# Patient Record
Sex: MALE | Race: WHITE | NOT HISPANIC OR LATINO | Employment: FULL TIME | URBAN - METROPOLITAN AREA
[De-identification: names, ages, dates, MRNs, and addresses within clinical notes are randomized per-mention and may not be internally consistent; named-entity substitution may affect disease eponyms.]

---

## 2017-02-06 ENCOUNTER — HOSPITAL ENCOUNTER (OUTPATIENT)
Dept: LAB | Facility: MEDICAL CENTER | Age: 67
End: 2017-02-06
Attending: FAMILY MEDICINE
Payer: COMMERCIAL

## 2017-02-06 LAB
ALBUMIN SERPL BCP-MCNC: 4.5 G/DL (ref 3.2–4.9)
ALBUMIN/GLOB SERPL: 1.7 G/DL
ALP SERPL-CCNC: 108 U/L (ref 30–99)
ALT SERPL-CCNC: 19 U/L (ref 2–50)
ANION GAP SERPL CALC-SCNC: 8 MMOL/L (ref 0–11.9)
AST SERPL-CCNC: 20 U/L (ref 12–45)
BILIRUB SERPL-MCNC: 0.5 MG/DL (ref 0.1–1.5)
BUN SERPL-MCNC: 18 MG/DL (ref 8–22)
CALCIUM SERPL-MCNC: 9.6 MG/DL (ref 8.5–10.5)
CHLORIDE SERPL-SCNC: 103 MMOL/L (ref 96–112)
CHOLEST SERPL-MCNC: 176 MG/DL (ref 100–199)
CO2 SERPL-SCNC: 27 MMOL/L (ref 20–33)
CREAT SERPL-MCNC: 0.84 MG/DL (ref 0.5–1.4)
EST. AVERAGE GLUCOSE BLD GHB EST-MCNC: 200 MG/DL
GLOBULIN SER CALC-MCNC: 2.6 G/DL (ref 1.9–3.5)
GLUCOSE SERPL-MCNC: 153 MG/DL (ref 65–99)
HBA1C MFR BLD: 8.6 % (ref 0–5.6)
HDLC SERPL-MCNC: 45 MG/DL
LDLC SERPL CALC-MCNC: 108 MG/DL
POTASSIUM SERPL-SCNC: 3.9 MMOL/L (ref 3.6–5.5)
PROT SERPL-MCNC: 7.1 G/DL (ref 6–8.2)
PSA SERPL DL<=0.01 NG/ML-MCNC: 0.09 NG/ML (ref 0–4)
SODIUM SERPL-SCNC: 138 MMOL/L (ref 135–145)
TRIGL SERPL-MCNC: 113 MG/DL (ref 0–149)

## 2017-02-06 PROCEDURE — 83036 HEMOGLOBIN GLYCOSYLATED A1C: CPT

## 2017-02-06 PROCEDURE — 84153 ASSAY OF PSA TOTAL: CPT

## 2017-02-06 PROCEDURE — 80061 LIPID PANEL: CPT

## 2017-02-06 PROCEDURE — 36415 COLL VENOUS BLD VENIPUNCTURE: CPT

## 2017-02-06 PROCEDURE — 80053 COMPREHEN METABOLIC PANEL: CPT

## 2017-05-30 ENCOUNTER — HOSPITAL ENCOUNTER (OUTPATIENT)
Dept: LAB | Facility: MEDICAL CENTER | Age: 67
End: 2017-05-30
Attending: FAMILY MEDICINE
Payer: COMMERCIAL

## 2017-05-30 LAB
ALBUMIN SERPL BCP-MCNC: 3.9 G/DL (ref 3.2–4.9)
ALBUMIN/GLOB SERPL: 1.3 G/DL
ALP SERPL-CCNC: 96 U/L (ref 30–99)
ALT SERPL-CCNC: 17 U/L (ref 2–50)
ANION GAP SERPL CALC-SCNC: 8 MMOL/L (ref 0–11.9)
AST SERPL-CCNC: 16 U/L (ref 12–45)
BASOPHILS # BLD AUTO: 0.6 % (ref 0–1.8)
BASOPHILS # BLD: 0.04 K/UL (ref 0–0.12)
BILIRUB SERPL-MCNC: 0.7 MG/DL (ref 0.1–1.5)
BUN SERPL-MCNC: 19 MG/DL (ref 8–22)
CALCIUM SERPL-MCNC: 9 MG/DL (ref 8.5–10.5)
CHLORIDE SERPL-SCNC: 102 MMOL/L (ref 96–112)
CHOLEST SERPL-MCNC: 173 MG/DL (ref 100–199)
CO2 SERPL-SCNC: 25 MMOL/L (ref 20–33)
CREAT SERPL-MCNC: 0.86 MG/DL (ref 0.5–1.4)
EOSINOPHIL # BLD AUTO: 0.16 K/UL (ref 0–0.51)
EOSINOPHIL NFR BLD: 2.2 % (ref 0–6.9)
ERYTHROCYTE [DISTWIDTH] IN BLOOD BY AUTOMATED COUNT: 45.5 FL (ref 35.9–50)
EST. AVERAGE GLUCOSE BLD GHB EST-MCNC: 229 MG/DL
GFR SERPL CREATININE-BSD FRML MDRD: >60 ML/MIN/1.73 M 2
GLOBULIN SER CALC-MCNC: 3.1 G/DL (ref 1.9–3.5)
GLUCOSE SERPL-MCNC: 144 MG/DL (ref 65–99)
HBA1C MFR BLD: 9.6 % (ref 0–5.6)
HCT VFR BLD AUTO: 46.5 % (ref 42–52)
HDLC SERPL-MCNC: 50 MG/DL
HGB BLD-MCNC: 15 G/DL (ref 14–18)
IMM GRANULOCYTES # BLD AUTO: 0.03 K/UL (ref 0–0.11)
IMM GRANULOCYTES NFR BLD AUTO: 0.4 % (ref 0–0.9)
LDLC SERPL CALC-MCNC: 96 MG/DL
LYMPHOCYTES # BLD AUTO: 2 K/UL (ref 1–4.8)
LYMPHOCYTES NFR BLD: 28.1 % (ref 22–41)
MCH RBC QN AUTO: 29.2 PG (ref 27–33)
MCHC RBC AUTO-ENTMCNC: 32.3 G/DL (ref 33.7–35.3)
MCV RBC AUTO: 90.6 FL (ref 81.4–97.8)
MONOCYTES # BLD AUTO: 0.61 K/UL (ref 0–0.85)
MONOCYTES NFR BLD AUTO: 8.6 % (ref 0–13.4)
NEUTROPHILS # BLD AUTO: 4.29 K/UL (ref 1.82–7.42)
NEUTROPHILS NFR BLD: 60.1 % (ref 44–72)
NRBC # BLD AUTO: 0 K/UL
NRBC BLD AUTO-RTO: 0 /100 WBC
PLATELET # BLD AUTO: 251 K/UL (ref 164–446)
PMV BLD AUTO: 10.2 FL (ref 9–12.9)
POTASSIUM SERPL-SCNC: 3.8 MMOL/L (ref 3.6–5.5)
PROT SERPL-MCNC: 7 G/DL (ref 6–8.2)
RBC # BLD AUTO: 5.13 M/UL (ref 4.7–6.1)
SODIUM SERPL-SCNC: 135 MMOL/L (ref 135–145)
TRIGL SERPL-MCNC: 137 MG/DL (ref 0–149)
WBC # BLD AUTO: 7.1 K/UL (ref 4.8–10.8)

## 2017-05-30 PROCEDURE — 80061 LIPID PANEL: CPT

## 2017-05-30 PROCEDURE — 80053 COMPREHEN METABOLIC PANEL: CPT

## 2017-05-30 PROCEDURE — 83036 HEMOGLOBIN GLYCOSYLATED A1C: CPT

## 2017-05-30 PROCEDURE — 36415 COLL VENOUS BLD VENIPUNCTURE: CPT

## 2017-05-30 PROCEDURE — 85025 COMPLETE CBC W/AUTO DIFF WBC: CPT

## 2017-10-19 ENCOUNTER — HOSPITAL ENCOUNTER (OUTPATIENT)
Dept: LAB | Facility: MEDICAL CENTER | Age: 67
End: 2017-10-19
Attending: FAMILY MEDICINE
Payer: COMMERCIAL

## 2017-10-19 LAB
EST. AVERAGE GLUCOSE BLD GHB EST-MCNC: 209 MG/DL
HBA1C MFR BLD: 8.9 % (ref 0–5.6)

## 2017-10-19 PROCEDURE — 83036 HEMOGLOBIN GLYCOSYLATED A1C: CPT

## 2017-10-19 PROCEDURE — 36415 COLL VENOUS BLD VENIPUNCTURE: CPT

## 2017-12-18 ENCOUNTER — HOSPITAL ENCOUNTER (OUTPATIENT)
Dept: LAB | Facility: MEDICAL CENTER | Age: 67
End: 2017-12-18
Attending: FAMILY MEDICINE
Payer: COMMERCIAL

## 2017-12-18 LAB
ALBUMIN SERPL BCP-MCNC: 4.1 G/DL (ref 3.2–4.9)
ALBUMIN/GLOB SERPL: 1.5 G/DL
ALP SERPL-CCNC: 82 U/L (ref 30–99)
ALT SERPL-CCNC: 18 U/L (ref 2–50)
ANION GAP SERPL CALC-SCNC: 7 MMOL/L (ref 0–11.9)
AST SERPL-CCNC: 18 U/L (ref 12–45)
BILIRUB SERPL-MCNC: 0.7 MG/DL (ref 0.1–1.5)
BUN SERPL-MCNC: 17 MG/DL (ref 8–22)
CALCIUM SERPL-MCNC: 9.2 MG/DL (ref 8.5–10.5)
CHLORIDE SERPL-SCNC: 102 MMOL/L (ref 96–112)
CHOLEST SERPL-MCNC: 146 MG/DL (ref 100–199)
CO2 SERPL-SCNC: 28 MMOL/L (ref 20–33)
CREAT SERPL-MCNC: 0.85 MG/DL (ref 0.5–1.4)
EST. AVERAGE GLUCOSE BLD GHB EST-MCNC: 209 MG/DL
GFR SERPL CREATININE-BSD FRML MDRD: >60 ML/MIN/1.73 M 2
GLOBULIN SER CALC-MCNC: 2.8 G/DL (ref 1.9–3.5)
GLUCOSE SERPL-MCNC: 163 MG/DL (ref 65–99)
HBA1C MFR BLD: 8.9 % (ref 0–5.6)
HDLC SERPL-MCNC: 47 MG/DL
LDLC SERPL CALC-MCNC: 73 MG/DL
POTASSIUM SERPL-SCNC: 4.6 MMOL/L (ref 3.6–5.5)
PROT SERPL-MCNC: 6.9 G/DL (ref 6–8.2)
SODIUM SERPL-SCNC: 137 MMOL/L (ref 135–145)
TRIGL SERPL-MCNC: 131 MG/DL (ref 0–149)

## 2017-12-18 PROCEDURE — 80053 COMPREHEN METABOLIC PANEL: CPT

## 2017-12-18 PROCEDURE — 83036 HEMOGLOBIN GLYCOSYLATED A1C: CPT

## 2017-12-18 PROCEDURE — 80061 LIPID PANEL: CPT

## 2017-12-18 PROCEDURE — 36415 COLL VENOUS BLD VENIPUNCTURE: CPT

## 2018-03-09 ENCOUNTER — HOSPITAL ENCOUNTER (OUTPATIENT)
Dept: LAB | Facility: MEDICAL CENTER | Age: 68
End: 2018-03-09
Attending: FAMILY MEDICINE
Payer: COMMERCIAL

## 2018-03-09 LAB
ALBUMIN SERPL BCP-MCNC: 4.2 G/DL (ref 3.2–4.9)
ALBUMIN/GLOB SERPL: 1.4 G/DL
ALP SERPL-CCNC: 76 U/L (ref 30–99)
ALT SERPL-CCNC: 17 U/L (ref 2–50)
ANION GAP SERPL CALC-SCNC: 7 MMOL/L (ref 0–11.9)
AST SERPL-CCNC: 18 U/L (ref 12–45)
BILIRUB SERPL-MCNC: 0.7 MG/DL (ref 0.1–1.5)
BUN SERPL-MCNC: 17 MG/DL (ref 8–22)
CALCIUM SERPL-MCNC: 9.6 MG/DL (ref 8.5–10.5)
CHLORIDE SERPL-SCNC: 102 MMOL/L (ref 96–112)
CHOLEST SERPL-MCNC: 149 MG/DL (ref 100–199)
CO2 SERPL-SCNC: 27 MMOL/L (ref 20–33)
CREAT SERPL-MCNC: 0.85 MG/DL (ref 0.5–1.4)
EST. AVERAGE GLUCOSE BLD GHB EST-MCNC: 220 MG/DL
GLOBULIN SER CALC-MCNC: 2.9 G/DL (ref 1.9–3.5)
GLUCOSE SERPL-MCNC: 134 MG/DL (ref 65–99)
HBA1C MFR BLD: 9.3 % (ref 0–5.6)
HDLC SERPL-MCNC: 47 MG/DL
LDLC SERPL CALC-MCNC: 83 MG/DL
POTASSIUM SERPL-SCNC: 4.1 MMOL/L (ref 3.6–5.5)
PROT SERPL-MCNC: 7.1 G/DL (ref 6–8.2)
SODIUM SERPL-SCNC: 136 MMOL/L (ref 135–145)
TRIGL SERPL-MCNC: 97 MG/DL (ref 0–149)

## 2018-03-09 PROCEDURE — 80061 LIPID PANEL: CPT

## 2018-03-09 PROCEDURE — 80053 COMPREHEN METABOLIC PANEL: CPT

## 2018-03-09 PROCEDURE — 36415 COLL VENOUS BLD VENIPUNCTURE: CPT

## 2018-03-09 PROCEDURE — 83036 HEMOGLOBIN GLYCOSYLATED A1C: CPT

## 2018-06-13 ENCOUNTER — HOSPITAL ENCOUNTER (OUTPATIENT)
Dept: LAB | Facility: MEDICAL CENTER | Age: 68
End: 2018-06-13
Attending: FAMILY MEDICINE
Payer: COMMERCIAL

## 2018-06-13 LAB
ALBUMIN SERPL BCP-MCNC: 4.3 G/DL (ref 3.2–4.9)
ALBUMIN/GLOB SERPL: 1.3 G/DL
ALP SERPL-CCNC: 64 U/L (ref 30–99)
ALT SERPL-CCNC: 17 U/L (ref 2–50)
ANION GAP SERPL CALC-SCNC: 10 MMOL/L (ref 0–11.9)
AST SERPL-CCNC: 20 U/L (ref 12–45)
BILIRUB SERPL-MCNC: 0.6 MG/DL (ref 0.1–1.5)
BUN SERPL-MCNC: 20 MG/DL (ref 8–22)
CALCIUM SERPL-MCNC: 9.2 MG/DL (ref 8.5–10.5)
CHLORIDE SERPL-SCNC: 103 MMOL/L (ref 96–112)
CHOLEST SERPL-MCNC: 149 MG/DL (ref 100–199)
CO2 SERPL-SCNC: 27 MMOL/L (ref 20–33)
CREAT SERPL-MCNC: 0.97 MG/DL (ref 0.5–1.4)
EST. AVERAGE GLUCOSE BLD GHB EST-MCNC: 169 MG/DL
GLOBULIN SER CALC-MCNC: 3.3 G/DL (ref 1.9–3.5)
GLUCOSE SERPL-MCNC: 105 MG/DL (ref 65–99)
HBA1C MFR BLD: 7.5 % (ref 0–5.6)
HDLC SERPL-MCNC: 49 MG/DL
LDLC SERPL CALC-MCNC: 89 MG/DL
POTASSIUM SERPL-SCNC: 3.7 MMOL/L (ref 3.6–5.5)
PROT SERPL-MCNC: 7.6 G/DL (ref 6–8.2)
SODIUM SERPL-SCNC: 140 MMOL/L (ref 135–145)
TRIGL SERPL-MCNC: 57 MG/DL (ref 0–149)

## 2018-06-13 PROCEDURE — 80053 COMPREHEN METABOLIC PANEL: CPT

## 2018-06-13 PROCEDURE — 36415 COLL VENOUS BLD VENIPUNCTURE: CPT

## 2018-06-13 PROCEDURE — 80061 LIPID PANEL: CPT

## 2018-06-13 PROCEDURE — 83036 HEMOGLOBIN GLYCOSYLATED A1C: CPT

## 2018-09-20 ENCOUNTER — HOSPITAL ENCOUNTER (OUTPATIENT)
Dept: LAB | Facility: MEDICAL CENTER | Age: 68
End: 2018-09-20
Attending: FAMILY MEDICINE
Payer: COMMERCIAL

## 2018-09-20 LAB
ALBUMIN SERPL BCP-MCNC: 4.3 G/DL (ref 3.2–4.9)
ALBUMIN/GLOB SERPL: 1.5 G/DL
ALP SERPL-CCNC: 80 U/L (ref 30–99)
ALT SERPL-CCNC: 16 U/L (ref 2–50)
ANION GAP SERPL CALC-SCNC: 6 MMOL/L (ref 0–11.9)
AST SERPL-CCNC: 16 U/L (ref 12–45)
BASOPHILS # BLD AUTO: 0.7 % (ref 0–1.8)
BASOPHILS # BLD: 0.05 K/UL (ref 0–0.12)
BILIRUB SERPL-MCNC: 0.6 MG/DL (ref 0.1–1.5)
BUN SERPL-MCNC: 18 MG/DL (ref 8–22)
CALCIUM SERPL-MCNC: 9.3 MG/DL (ref 8.5–10.5)
CHLORIDE SERPL-SCNC: 103 MMOL/L (ref 96–112)
CHOLEST SERPL-MCNC: 153 MG/DL (ref 100–199)
CO2 SERPL-SCNC: 30 MMOL/L (ref 20–33)
CREAT SERPL-MCNC: 0.96 MG/DL (ref 0.5–1.4)
EOSINOPHIL # BLD AUTO: 0.14 K/UL (ref 0–0.51)
EOSINOPHIL NFR BLD: 1.9 % (ref 0–6.9)
ERYTHROCYTE [DISTWIDTH] IN BLOOD BY AUTOMATED COUNT: 46.4 FL (ref 35.9–50)
EST. AVERAGE GLUCOSE BLD GHB EST-MCNC: 171 MG/DL
GLOBULIN SER CALC-MCNC: 2.8 G/DL (ref 1.9–3.5)
GLUCOSE SERPL-MCNC: 108 MG/DL (ref 65–99)
HBA1C MFR BLD: 7.6 % (ref 0–5.6)
HCT VFR BLD AUTO: 48.8 % (ref 42–52)
HDLC SERPL-MCNC: 45 MG/DL
HGB BLD-MCNC: 15.6 G/DL (ref 14–18)
IMM GRANULOCYTES # BLD AUTO: 0.03 K/UL (ref 0–0.11)
IMM GRANULOCYTES NFR BLD AUTO: 0.4 % (ref 0–0.9)
LDLC SERPL CALC-MCNC: 84 MG/DL
LYMPHOCYTES # BLD AUTO: 2.11 K/UL (ref 1–4.8)
LYMPHOCYTES NFR BLD: 28.1 % (ref 22–41)
MCH RBC QN AUTO: 29.3 PG (ref 27–33)
MCHC RBC AUTO-ENTMCNC: 32 G/DL (ref 33.7–35.3)
MCV RBC AUTO: 91.6 FL (ref 81.4–97.8)
MONOCYTES # BLD AUTO: 0.58 K/UL (ref 0–0.85)
MONOCYTES NFR BLD AUTO: 7.7 % (ref 0–13.4)
NEUTROPHILS # BLD AUTO: 4.59 K/UL (ref 1.82–7.42)
NEUTROPHILS NFR BLD: 61.2 % (ref 44–72)
NRBC # BLD AUTO: 0 K/UL
NRBC BLD-RTO: 0 /100 WBC
PLATELET # BLD AUTO: 251 K/UL (ref 164–446)
PMV BLD AUTO: 10.4 FL (ref 9–12.9)
POTASSIUM SERPL-SCNC: 4 MMOL/L (ref 3.6–5.5)
PROT SERPL-MCNC: 7.1 G/DL (ref 6–8.2)
PSA SERPL-MCNC: 0.07 NG/ML (ref 0–4)
RBC # BLD AUTO: 5.33 M/UL (ref 4.7–6.1)
SODIUM SERPL-SCNC: 139 MMOL/L (ref 135–145)
TRIGL SERPL-MCNC: 122 MG/DL (ref 0–149)
WBC # BLD AUTO: 7.5 K/UL (ref 4.8–10.8)

## 2018-09-20 PROCEDURE — 85025 COMPLETE CBC W/AUTO DIFF WBC: CPT

## 2018-09-20 PROCEDURE — 83036 HEMOGLOBIN GLYCOSYLATED A1C: CPT

## 2018-09-20 PROCEDURE — 36415 COLL VENOUS BLD VENIPUNCTURE: CPT

## 2018-09-20 PROCEDURE — 80061 LIPID PANEL: CPT

## 2018-09-20 PROCEDURE — 80053 COMPREHEN METABOLIC PANEL: CPT

## 2018-09-20 PROCEDURE — 84153 ASSAY OF PSA TOTAL: CPT

## 2018-10-24 ENCOUNTER — HOME STUDY (OUTPATIENT)
Dept: SLEEP MEDICINE | Facility: MEDICAL CENTER | Age: 68
End: 2018-10-24
Attending: FAMILY MEDICINE
Payer: COMMERCIAL

## 2018-10-24 ENCOUNTER — SLEEP CENTER VISIT (OUTPATIENT)
Dept: SLEEP MEDICINE | Facility: MEDICAL CENTER | Age: 68
End: 2018-10-24
Payer: COMMERCIAL

## 2018-10-24 VITALS
BODY MASS INDEX: 33.66 KG/M2 | RESPIRATION RATE: 15 BRPM | SYSTOLIC BLOOD PRESSURE: 118 MMHG | OXYGEN SATURATION: 96 % | DIASTOLIC BLOOD PRESSURE: 70 MMHG | HEART RATE: 74 BPM | HEIGHT: 73 IN | WEIGHT: 254 LBS

## 2018-10-24 DIAGNOSIS — G47.30 SLEEP DISORDER BREATHING: ICD-10-CM

## 2018-10-24 DIAGNOSIS — E66.9 OBESITY (BMI 30-39.9): ICD-10-CM

## 2018-10-24 PROCEDURE — 95806 SLEEP STUDY UNATT&RESP EFFT: CPT | Performed by: FAMILY MEDICINE

## 2018-10-24 PROCEDURE — 99243 OFF/OP CNSLTJ NEW/EST LOW 30: CPT | Performed by: FAMILY MEDICINE

## 2018-10-24 RX ORDER — PIOGLITAZONEHYDROCHLORIDE 30 MG/1
30 TABLET ORAL DAILY
COMMUNITY
End: 2023-04-19

## 2018-10-24 RX ORDER — PIROXICAM 10 MG/1
10 CAPSULE ORAL DAILY
COMMUNITY
End: 2023-04-19

## 2018-10-24 RX ORDER — LOSARTAN POTASSIUM AND HYDROCHLOROTHIAZIDE 12.5; 5 MG/1; MG/1
1 TABLET ORAL DAILY
COMMUNITY
End: 2023-04-19

## 2018-10-24 RX ORDER — GLIMEPIRIDE 2 MG/1
2 TABLET ORAL EVERY MORNING
COMMUNITY
End: 2022-05-04

## 2018-10-24 NOTE — PROGRESS NOTES
"     [facility]  Consult Note     Date: 10/24/2018 / Time: 1:39 PM    Patient ID:   Name:             Darvin Jackson   YOB: 1950  Age:                 68 y.o.  male   MRN:               [id number]      Thank you for requesting a sleep medicine consultation on Darvin Jackson at the sleep center.he presents today with the chief complaints of ROXANE and to establish as a new pt. he was previously seen by . Pt was diagnosed with ROXANE in 1980's and has been on CPAPsince then. His current CPAP is 10 years old and [facility]. The initial presenting symptoms were snoring and witnessed apneas. He is referred by Dr. Lundberg for evaluation and treatment of sleep disorder breathing.     HISTORY OF PRESENT ILLNESS:       At night,  Darvin Jackson goes to bed around 10 pm on weekdays and on weekends. He gets out of bed at 4:30 am on weekdays and on weekends.He averages 6 hrs of sleep on a good night and 4-5 hrs on a bad night. Pt has bad nights 1 nights per week. He falls asleep within less than 5 min minutes. He rarely awakens middle of the night due  to no obvious reason. It takes him few min to fall back asleep.     Manuel is not aware of snoring/witnessed apneas/gasping or choking in sleep with the usage of the CPAP.  He  denies any symptoms of restless legs syndrome such as an \"urge to move\"  He  legs in the evening or bedtime. He  denies any symptoms of narcolepsy such as sleep paralysis or cataplexy, or any symptoms to suggest parasomnias such as sleep walking or acting out of dreams. He  has not used any medications for her sleep problem.    Overall,  He does finds his sleep refreshing. When He  wakes up in the morning, He does not feel tired. In terms of  excessive daytime sleepiness,  He complains of sleepiness while  at work or watching TV. [place] sleepiness scale score is normal at  5/24.   He does not take regular naps.   REVIEW OF SYSTEMS:    "    Constitutional: Denies fevers, Denies weight changes  Eyes: Denies changes in vision, no eye pain  Ears/Nose/Throat/Mouth: Denies nasal congestion or sore throat   Cardiovascular: Denies chest pain or palpitations   Respiratory: Denies shortness of breath , Denies cough  Gastrointestinal/Hepatic: Denies abdominal pain, nausea, vomiting, diarrhea, constipation or GI bleeding   Genitourinary: Denies bladder dysfunction, dysuria or frequency  Musculoskeletal/Rheum: Denies  joint pain and swelling   Skin/Breast: Denies rash, denies breast lumps or discharge  Neurological: Denies headache, confusion, memory loss or focal weakness/parasthesias  Psychiatric: denies mood disorder     Comprehensive review of systems form is reviewed with the patient and is attached in the EMR.     PMH:  has no past medical history on file.  MEDS:   Current Outpatient Prescriptions:   •  Dulaglutide (TRULICITY) 1.5 MG/0.5ML Solution Pen-injector, Inject  as instructed., Disp: , Rfl:   •  pioglitazone (ACTOS) 30 MG Tab, Take 30 mg by mouth every day., Disp: , Rfl:   •  piroxicam (FELDENE) 10 MG Cap, Take 10 mg by mouth every day., Disp: , Rfl:   •  Empagliflozin-Metformin HCl ER (SYNJARDY XR) 12.5-1000 MG TABLET SR 24 HR, Take  by mouth., Disp: , Rfl:   •  losartan-hydrochlorothiazide (HYZAAR) 50-12.5 MG per tablet, Take 1 Tab by mouth every day., Disp: , Rfl:   •  glimepiride (AMARYL) 2 MG Tab, Take 2 mg by mouth every morning., Disp: , Rfl:   ALLERGIES: No Known Allergies  SURGHX: History reviewed. No pertinent surgical history.  SOCHX:  reports that he has been smoking Cigars.  He has never used smokeless tobacco. He reports that he drinks about 1.2 - 3.6 oz of alcohol per week . He reports that he does not use drugs...   FH:   Family History   Problem Relation Age of Onset   • Sleep Apnea Brother            Physical Exam:  Vitals/ General Appearance:   Weight/BMI: Body mass index is 33.51 kg/m².  Blood pressure 118/70, pulse 74, resp.  "rate 15, height 1.854 m (6' 1\"), weight 115.2 kg (254 lb), SpO2 96 %.  Vitals:    10/24/18 1333   BP: 118/70   BP Location: Left arm   Patient Position: Sitting   BP Cuff Size: Adult   Pulse: 74   Resp: 15   SpO2: 96%   Weight: 115.2 kg (254 lb)   Height: 1.854 m (6' 1\")       Pt. is alert and oriented to time, place and person. Cooperative and in no apparent distress.       1. Head: Atraumatic, normocephalic.   2. Ears: Normal tympanic membrane and no discharge  3. Nose: No inferior turbinate hypertophy  4. Throat: Oropharynx appears crowded in that the palate i overhanging ([place] scale 4. Tonsils are not enlarged, uvula is  large, pharynx not inflamed. Tongue is large. has intact dentition and oral hygiene is fair.  5. Neck: Supple. No thyromegaly  6. Chest: Trachea central  7. Lungs auscultation: B/L good air entry, vesicular breath sounds, no adventitious sounds  8. Heart auscultation: 1st and 2nd heart sounds normal, regular rhythm. No appreciable murmur.  9. Abdomen: Soft, non tender, no organomegaly. Bowel sounds present  10. Extremities:no pedal edema.   Peripheral pulses felt.  11. Skin: No rash  12. NEUROLOGICAL EXAMINATION: On neurological exam, the patient was alert and oriented x3. speech was clear and fluent without dysarthria.      INVESTIGATIONS:       ASSESSMENT AND PLAN       1. Sleep Apnea (ROXANE).He  Is currently on CPAP 18 cm .  The symptoms of excessive daytime, snoring and gasping has improved      The pathophysiology of sleep anea and the increased risk of cardiovascular morbidity from untreated sleep apnea is discussed in detail with the patient. He  also has HTN, DM which can be worsened by her ROXANE.     He is urged to avoid supine sleep, weight gain and alcoholic beverages since all of these can worsen sleep apnea. He is cautioned against drowsy driving. If He feels sleepy while driving, He must pull over for a break/nap, rather than persist on the road, in the interest of He own safety " and that of others on the road.   Plan   - Continue CPAP 18 cm with nasal pillow mask    - HST is ordered to re establish the diagnosis. Order new CPAP after the HST    - compliance was reinforced    - F/u for 1st compliance on new CPAP   4.Patient's body mass index is 33.51 kg/m². Exercise and nutrition counseling were performed at this visit.

## 2018-10-25 ENCOUNTER — TELEPHONE (OUTPATIENT)
Dept: SLEEP MEDICINE | Facility: MEDICAL CENTER | Age: 68
End: 2018-10-25

## 2018-10-25 DIAGNOSIS — G47.33 OSA (OBSTRUCTIVE SLEEP APNEA): ICD-10-CM

## 2018-10-25 NOTE — TELEPHONE ENCOUNTER
Pt brought in SD card for a download on CPAP. Scanned into media for review.  Also he just completed an HST, He would like to get a new CPAP with a different company, currently with [facility].    I advised him someone will let him know if we order one or if he needs more testing once the HST is read.

## 2018-10-27 NOTE — PROCEDURES
DIAGNOSTIC HOME SLEEP TEST (HST) REPORT       PATIENT ID:  NAME:  Darvin Jackson  MRN:               [id number]  YOB: 1950  DATE OF STUDY: 10/24/18      Impression:     This study shows evidence of:     1.Sever obstructive sleep apnea with  Respiratory Event Index (GRICEL) of 82.0 per hour. These findings are based on the recording time (flow evaluation time). It is not possible with this device to determine a traditional apnea+hypopnea index (AHI) for total sleep time since EEG channels are not available.     2.  O2 Sat. hiram was 69% and baseline O2 at 98 %. O2 sat was below 89% for 123 min of the flow evaluation time. Avg HR 60 BPM.      TECHNICAL DESCRIPTION:  [facility] Device used was a type-III home study device. Home sleep study recording included: Airflow recording by nasal pressure transducer; Respiratory Effort by abdominal Respiratory Bands; O2 by finger oximetry. A position sensor and a snore channel was also used.    Scoring Criteria: A modification of the the AASM Manual for the Scoring of Sleep and Associated Events, 2012, was used.   Obstructive apnea was scored by cessation of airflow for at least 10 seconds with continuing respiratory effort.  Central apnea was scored by cessation of airflow for at least 10 seconds with no effort.  Hypopnea was scored by a 30% or more reduction in airflow for at least 10 seconds accompanied by an arterial oxygen desaturation of 3% or more.  (For Medicare patients, hypopneas were scored by a 30% or more reduction in airflow for at least 10 seconds accompanied by an arterial oxygen saturation of 4% or more, as required by their insurance, CMS.        General sleep summary: . Total recording time is 348 minutes and total flow evaluation time is 317 minutes. The patient spent 288 minutes in the supine position and 28 minutes in the nonsupine position.    Respiratory events:    Apneas: 407 (Obstructive apnea index 77/hr, Central apnea index 0 /hr,  mixed 0 /hour)  Hypopneas: 26    Recommendations:    1. CPAP titration study vs Auto CPAP trial .   2.   In general patients with sleep apnea are advised to avoid alcohol and sedatives and to not operate a motor vehicle while drowsy. In some cases alternative treatment options may prove effective in resolving sleep apnea in these options include upper airway surgery, the use of a dental orthotic or weight loss and positional therapy. Clinical correlation is required.         Charla Amin MD

## 2018-10-31 NOTE — TELEPHONE ENCOUNTER
Called informing him that Dr. Amin did sign in order for a new cpap machine. I also asked pt if he would like to stay with [facility] or if he will like to change DME company. Pt stated he will like to change his DME because he did have some issues with [facility]. I informed pt I will be faxing the order to DME:  [facility] /  [phone] / fax [phone]. I informed pt that [facility] will be contacting him within 2-3 week regarding his order and if he does not hear from them I advise him to contact them right away. I provide pt with [facility] phone number.

## 2019-01-08 ENCOUNTER — TELEPHONE (OUTPATIENT)
Dept: SLEEP MEDICINE | Facility: MEDICAL CENTER | Age: 69
End: 2019-01-08

## 2019-01-08 DIAGNOSIS — G47.33 OSA (OBSTRUCTIVE SLEEP APNEA): ICD-10-CM

## 2019-01-08 NOTE — TELEPHONE ENCOUNTER
"A faxed come over from [facility] with the compliance report stating that the pt requested the compliance report to be fax to us and she is also requesting a pressure change because right now the pressure is \" too high\".     Compliance is scan into media please advise.   "

## 2019-01-09 NOTE — TELEPHONE ENCOUNTER
Called pt to inform him that Dr. Amin did review his compliance report. Please see previous encounter for Dr. Amin response. Pt was okay with his response and is going to see if changing his mask will help out.

## 2019-01-09 NOTE — TELEPHONE ENCOUNTER
Recent compliance was reviewed. She is currently on ACPAP 17-20 cm. Avg pressure 17.5-18.5cm and the AHI is well controlled at 2.6/hr.

## 2019-01-10 ENCOUNTER — HOSPITAL ENCOUNTER (OUTPATIENT)
Dept: LAB | Facility: MEDICAL CENTER | Age: 69
End: 2019-01-10
Attending: FAMILY MEDICINE
Payer: COMMERCIAL

## 2019-01-10 LAB
ALBUMIN SERPL BCP-MCNC: 4.1 G/DL (ref 3.2–4.9)
ALBUMIN/GLOB SERPL: 1.3 G/DL
ALP SERPL-CCNC: 77 U/L (ref 30–99)
ALT SERPL-CCNC: 13 U/L (ref 2–50)
ANION GAP SERPL CALC-SCNC: 9 MMOL/L (ref 0–11.9)
AST SERPL-CCNC: 16 U/L (ref 12–45)
BILIRUB SERPL-MCNC: 0.8 MG/DL (ref 0.1–1.5)
BUN SERPL-MCNC: 16 MG/DL (ref 8–22)
CALCIUM SERPL-MCNC: 9 MG/DL (ref 8.5–10.5)
CHLORIDE SERPL-SCNC: 103 MMOL/L (ref 96–112)
CHOLEST SERPL-MCNC: 126 MG/DL (ref 100–199)
CO2 SERPL-SCNC: 27 MMOL/L (ref 20–33)
CREAT SERPL-MCNC: 1 MG/DL (ref 0.5–1.4)
EST. AVERAGE GLUCOSE BLD GHB EST-MCNC: 192 MG/DL
GLOBULIN SER CALC-MCNC: 3.1 G/DL (ref 1.9–3.5)
GLUCOSE SERPL-MCNC: 100 MG/DL (ref 65–99)
HBA1C MFR BLD: 8.3 % (ref 0–5.6)
HDLC SERPL-MCNC: 43 MG/DL
LDLC SERPL CALC-MCNC: 68 MG/DL
POTASSIUM SERPL-SCNC: 3.9 MMOL/L (ref 3.6–5.5)
PROT SERPL-MCNC: 7.2 G/DL (ref 6–8.2)
SODIUM SERPL-SCNC: 139 MMOL/L (ref 135–145)
TRIGL SERPL-MCNC: 77 MG/DL (ref 0–149)

## 2019-01-10 PROCEDURE — 83036 HEMOGLOBIN GLYCOSYLATED A1C: CPT

## 2019-01-10 PROCEDURE — 80053 COMPREHEN METABOLIC PANEL: CPT

## 2019-01-10 PROCEDURE — 80061 LIPID PANEL: CPT

## 2019-01-10 PROCEDURE — 36415 COLL VENOUS BLD VENIPUNCTURE: CPT

## 2019-02-24 PROBLEM — G47.33 OSA (OBSTRUCTIVE SLEEP APNEA): Status: ACTIVE | Noted: 2019-02-24

## 2019-02-24 PROBLEM — Z92.29 UP TO DATE WITH INFLUENZA VACCINATION: Status: ACTIVE | Noted: 2019-02-24

## 2019-02-24 PROBLEM — F17.200 SMOKER: Status: ACTIVE | Noted: 2019-02-24

## 2019-02-25 ENCOUNTER — SLEEP CENTER VISIT (OUTPATIENT)
Dept: SLEEP MEDICINE | Facility: MEDICAL CENTER | Age: 69
End: 2019-02-25
Payer: COMMERCIAL

## 2019-02-25 VITALS
HEART RATE: 70 BPM | HEIGHT: 73 IN | RESPIRATION RATE: 16 BRPM | WEIGHT: 252 LBS | SYSTOLIC BLOOD PRESSURE: 116 MMHG | BODY MASS INDEX: 33.4 KG/M2 | OXYGEN SATURATION: 96 % | DIASTOLIC BLOOD PRESSURE: 60 MMHG

## 2019-02-25 DIAGNOSIS — E66.9 OBESITY (BMI 30-39.9): ICD-10-CM

## 2019-02-25 DIAGNOSIS — G47.33 OSA (OBSTRUCTIVE SLEEP APNEA): ICD-10-CM

## 2019-02-25 DIAGNOSIS — Z92.29 UP TO DATE WITH INFLUENZA VACCINATION: ICD-10-CM

## 2019-02-25 DIAGNOSIS — F17.200 SMOKER: ICD-10-CM

## 2019-02-25 PROCEDURE — 99214 OFFICE O/P EST MOD 30 MIN: CPT | Performed by: INTERNAL MEDICINE

## 2019-02-25 RX ORDER — ZOLPIDEM TARTRATE 5 MG/1
5 TABLET ORAL NIGHTLY PRN
Qty: 3 TAB | Refills: 0 | Status: SHIPPED | OUTPATIENT
Start: 2019-02-25 | End: 2019-02-25

## 2019-02-25 NOTE — PROGRESS NOTES
CC: Follow up for compliance    HPI:  Mr. Darvin Jackson RTC for HSAT results which showed an ahi of 82/hiram sat 69%.  A home sleep apnea test was done to reconfirm the diagnosis of obstructive sleep apnea syndrome in anticipation of getting him a new CPAP machine which he already got. He is currently on auto titrating CPAP 17-20 cm H2O with an average apnea hypopnea index of 3.2 and essentially no leak whatsoever.  He is using a nasal pillow mask. 30 day compliance is 100% for 6 hours and 13 minutes.    He was originally diagnosed to have sleep apnea in the 1980s and has been on CPAP since then.  He is a former patient of Dr. Beasley.    The patient reports improved symptoms using positive airway pressure.  Has experienced no significant problems with mask fit, mask leak, pressure tolerance, excessive airway dryness, nasal congestion, aerophagia, or chest pain.    Significant comorbidities and modifying factors include tobacco abuse, obesity, prostate cancer, hypertension, diabetes, and up-to-date with influenza vaccination      Patient Active Problem List    Diagnosis Date Noted   • ROXANE (obstructive sleep apnea) 02/24/2019   • Up to date with influenza vaccination 02/24/2019   • Smoker 02/24/2019   • Obesity (BMI 30-39.9) 10/24/2018       History reviewed. No pertinent past medical history.     History reviewed. No pertinent surgical history.    Family History   Problem Relation Age of Onset   • Sleep Apnea Brother        Social History     Social History   • Marital status:      Spouse name: N/A   • Number of children: N/A   • Years of education: N/A     Occupational History   • Not on file.     Social History Main Topics   • Smoking status: Current Some Day Smoker     Types: Cigars   • Smokeless tobacco: Never Used   • Alcohol use 1.2 - 3.6 oz/week     2 - 6 Cans of beer per week   • Drug use: No   • Sexual activity: Not on file     Other Topics Concern   • Not on file     Social History Narrative  "  • No narrative on file       Current Outpatient Prescriptions   Medication Sig Dispense Refill   • Dulaglutide (TRULICITY) 1.5 MG/0.5ML Solution Pen-injector Inject  as instructed.     • pioglitazone (ACTOS) 30 MG Tab Take 30 mg by mouth every day.     • Empagliflozin-Metformin HCl ER (SYNJARDY XR) 12.5-1000 MG TABLET SR 24 HR Take  by mouth.     • losartan-hydrochlorothiazide (HYZAAR) 50-12.5 MG per tablet Take 1 Tab by mouth every day.     • glimepiride (AMARYL) 2 MG Tab Take 2 mg by mouth every morning.     • piroxicam (FELDENE) 10 MG Cap Take 10 mg by mouth every day.       No current facility-administered medications for this visit.     \"CURRENT RX\"    ALLERGIES: Patient has no known allergies.    ROS    Constitutional: Denies fever, chills, sweats,  weight loss, fatigue  Cardiovascular: Denies chest pain, tightness, palpitations, swelling in legs/feet, fainting, difficulty breathing when lying down but gets better when sitting up.   Respiratory: Denies shortness of breath, cough, sputum, wheezing, painful breathing, coughing up blood.   Sleep: per HPI  Gastrointestinal: Denies  difficulty swallowing, nausea, abdominal pain, diarrhea, constipation, heartburn.  Musculoskeletal: Denies painful joints, sore muscles, back pain.        PHYSICAL EXAM  Obese  /60 (BP Location: Right arm, Patient Position: Sitting, BP Cuff Size: Adult)   Pulse 70   Resp 16   Ht 1.854 m (6' 1\")   Wt 114.3 kg (252 lb)   SpO2 96%   BMI 33.25 kg/m²   Appearance: Well-nourished, well-developed, no acute distress  Eyes:  PERRLA, EOMI  ENMT: without lesions, deformities;hearing grossly intact; tongue normal, posterior pharynx without erythema or exudate; Mallampati classification:   Neck: Supple, trachea midline, no masses  Respiratory effort:  No intercostal retractions or use of accessory muscles  Lung auscultation:  No wheezes rhonchi rubs or rales  Cardiac: No murmurs, rubs, or gallops; regular rhythm, normal rate; no " edema  Abdomen:  No tenderness, no organomegaly.  Obese  Musculoskeletal:  Grossly normal; gait and station normal; digits and nails normal  Skin:  No rashes, petechiae, cyanosis  Neurologic: without focal signs; oriented to person, time, place, and purpose; judgement intact  Psychiatric:  No depression, anxiety, agitation        PROBLEMS:  1. ROXANE (obstructive sleep apnea)    - DME CPAP    2. Up to date with influenza vaccination      3. Smoker      4. Obesity (BMI 30-39.9)    - OBESITY COUNSELING (No Charge): Patient identified as having weight management issue.  Appropriate orders and counseling given.      PLAN:   Has been advised to continue the current auto CPAP 17-20 cm H2O, clean equipment frequently, and get new mask and supplies as allowed by insurance and DME. Advised about potential problems including nasal obstruction/stuffiness, mask leak or discomfort , frequent awakenings, chill or dryness of the upper airway, noise, inconvenience, and lack of benefit. Recommend an earlier appointment, if significant treatment barriers develop.    He will speak to the DME company about his loud machine. Will empirically change in to 5-20 cmH2O as he feels the pressures are too high..    Have advised the patient to follow up with the appropriate healthcare practitioners for all other medical problems and issues.    Return to clinic in 6 months.

## 2019-04-24 ENCOUNTER — HOSPITAL ENCOUNTER (OUTPATIENT)
Dept: LAB | Facility: MEDICAL CENTER | Age: 69
End: 2019-04-24
Attending: FAMILY MEDICINE
Payer: COMMERCIAL

## 2019-04-24 LAB
ALBUMIN SERPL BCP-MCNC: 4.1 G/DL (ref 3.2–4.9)
ALBUMIN/GLOB SERPL: 1.5 G/DL
ALP SERPL-CCNC: 85 U/L (ref 30–99)
ALT SERPL-CCNC: 17 U/L (ref 2–50)
ANION GAP SERPL CALC-SCNC: 5 MMOL/L (ref 0–11.9)
AST SERPL-CCNC: 18 U/L (ref 12–45)
BILIRUB SERPL-MCNC: 0.7 MG/DL (ref 0.1–1.5)
BUN SERPL-MCNC: 23 MG/DL (ref 8–22)
CALCIUM SERPL-MCNC: 9.2 MG/DL (ref 8.5–10.5)
CHLORIDE SERPL-SCNC: 103 MMOL/L (ref 96–112)
CO2 SERPL-SCNC: 28 MMOL/L (ref 20–33)
CREAT SERPL-MCNC: 0.94 MG/DL (ref 0.5–1.4)
EST. AVERAGE GLUCOSE BLD GHB EST-MCNC: 183 MG/DL
GLOBULIN SER CALC-MCNC: 2.7 G/DL (ref 1.9–3.5)
GLUCOSE SERPL-MCNC: 117 MG/DL (ref 65–99)
HBA1C MFR BLD: 8 % (ref 0–5.6)
POTASSIUM SERPL-SCNC: 4.1 MMOL/L (ref 3.6–5.5)
PROT SERPL-MCNC: 6.8 G/DL (ref 6–8.2)
PSA SERPL-MCNC: 0.12 NG/ML (ref 0–4)
SODIUM SERPL-SCNC: 136 MMOL/L (ref 135–145)

## 2019-04-24 PROCEDURE — 83036 HEMOGLOBIN GLYCOSYLATED A1C: CPT

## 2019-04-24 PROCEDURE — 36415 COLL VENOUS BLD VENIPUNCTURE: CPT

## 2019-04-24 PROCEDURE — 80053 COMPREHEN METABOLIC PANEL: CPT

## 2019-04-24 PROCEDURE — 84153 ASSAY OF PSA TOTAL: CPT

## 2019-08-02 ENCOUNTER — HOSPITAL ENCOUNTER (OUTPATIENT)
Dept: LAB | Facility: MEDICAL CENTER | Age: 69
End: 2019-08-02
Attending: FAMILY MEDICINE
Payer: COMMERCIAL

## 2019-08-02 LAB
ALBUMIN SERPL BCP-MCNC: 4.1 G/DL (ref 3.2–4.9)
ALBUMIN/GLOB SERPL: 1.3 G/DL
ALP SERPL-CCNC: 86 U/L (ref 30–99)
ALT SERPL-CCNC: 20 U/L (ref 2–50)
ANION GAP SERPL CALC-SCNC: 9 MMOL/L (ref 0–11.9)
AST SERPL-CCNC: 16 U/L (ref 12–45)
BILIRUB SERPL-MCNC: 0.7 MG/DL (ref 0.1–1.5)
BUN SERPL-MCNC: 14 MG/DL (ref 8–22)
CALCIUM SERPL-MCNC: 9 MG/DL (ref 8.5–10.5)
CHLORIDE SERPL-SCNC: 101 MMOL/L (ref 96–112)
CHOLEST SERPL-MCNC: 179 MG/DL (ref 100–199)
CO2 SERPL-SCNC: 28 MMOL/L (ref 20–33)
CREAT SERPL-MCNC: 0.99 MG/DL (ref 0.5–1.4)
EST. AVERAGE GLUCOSE BLD GHB EST-MCNC: 186 MG/DL
FASTING STATUS PATIENT QL REPORTED: NORMAL
GLOBULIN SER CALC-MCNC: 3.2 G/DL (ref 1.9–3.5)
GLUCOSE SERPL-MCNC: 124 MG/DL (ref 65–99)
HBA1C MFR BLD: 8.1 % (ref 0–5.6)
HDLC SERPL-MCNC: 47 MG/DL
LDLC SERPL CALC-MCNC: 108 MG/DL
POTASSIUM SERPL-SCNC: 3.6 MMOL/L (ref 3.6–5.5)
PROT SERPL-MCNC: 7.3 G/DL (ref 6–8.2)
SODIUM SERPL-SCNC: 138 MMOL/L (ref 135–145)
TRIGL SERPL-MCNC: 118 MG/DL (ref 0–149)

## 2019-08-02 PROCEDURE — 83036 HEMOGLOBIN GLYCOSYLATED A1C: CPT

## 2019-08-02 PROCEDURE — 80053 COMPREHEN METABOLIC PANEL: CPT

## 2019-08-02 PROCEDURE — 80061 LIPID PANEL: CPT

## 2019-08-02 PROCEDURE — 36415 COLL VENOUS BLD VENIPUNCTURE: CPT

## 2019-08-21 ENCOUNTER — SLEEP CENTER VISIT (OUTPATIENT)
Dept: SLEEP MEDICINE | Facility: MEDICAL CENTER | Age: 69
End: 2019-08-21
Payer: COMMERCIAL

## 2019-08-21 VITALS
WEIGHT: 242 LBS | SYSTOLIC BLOOD PRESSURE: 116 MMHG | BODY MASS INDEX: 32.07 KG/M2 | HEART RATE: 80 BPM | OXYGEN SATURATION: 96 % | DIASTOLIC BLOOD PRESSURE: 62 MMHG | HEIGHT: 73 IN | RESPIRATION RATE: 16 BRPM

## 2019-08-21 DIAGNOSIS — G47.33 OSA (OBSTRUCTIVE SLEEP APNEA): ICD-10-CM

## 2019-08-21 DIAGNOSIS — I10 ESSENTIAL HYPERTENSION: ICD-10-CM

## 2019-08-21 DIAGNOSIS — F17.200 SMOKER: ICD-10-CM

## 2019-08-21 DIAGNOSIS — E66.9 OBESITY (BMI 30-39.9): ICD-10-CM

## 2019-08-21 PROCEDURE — 99214 OFFICE O/P EST MOD 30 MIN: CPT | Performed by: INTERNAL MEDICINE

## 2019-08-21 NOTE — PROGRESS NOTES
CC: Follow up for ROXANE on AutoPap    HPI:  Mr. Darvin Jackson is a 69-year-old  last seen 2/25/2019 for ROXANE on auto titrating CPAP 17-20 cm water.  At that time his residual average AHI was 3.2 with no leak.  Compliance is 100% for 6 hours and 13 minutes. Changed to 5-20 cm water at last OV.    He was originally diagnosed to have sleep apnea in the 1980s and has been on CPAP since then.  He is a former patient of Dr. Beasley.    Today, 8/21/2019, his 30-day compliance is 100% for 6 hours and 34 seconds.  His average residual apnea hypopnea index is 8.8.  He is on auto titrating CPAP 5 to 20 cm H2O.  His auto CPAP mean pressure is 12.  His auto CPAP peak average pressure is 14.5 cm water.     The patient reports improved symptoms using positive airway pressure.  Has experienced no significant problems with mask fit, mask leak, pressure tolerance, excessive airway dryness, nasal congestion, aerophagia, or chest pain    Significant comorbidities and modifying factors include tobacco abuse, obesity, prostate cancer, hypertension, diabetes, and up-to-date with influenza vaccination      Patient Active Problem List    Diagnosis Date Noted   • Essential hypertension 08/21/2019   • ROXANE (obstructive sleep apnea) 02/24/2019   • Up to date with influenza vaccination 02/24/2019   • Smoker 02/24/2019   • Obesity (BMI 30-39.9) 10/24/2018       History reviewed. No pertinent past medical history.     History reviewed. No pertinent surgical history.    Family History   Problem Relation Age of Onset   • Sleep Apnea Brother        Social History     Socioeconomic History   • Marital status:      Spouse name: Not on file   • Number of children: Not on file   • Years of education: Not on file   • Highest education level: Not on file   Occupational History   • Not on file   Social Needs   • Financial resource strain: Not on file   • Food insecurity:     Worry: Not on file     Inability: Not on file   •  "Transportation needs:     Medical: Not on file     Non-medical: Not on file   Tobacco Use   • Smoking status: Current Some Day Smoker     Types: Cigars   • Smokeless tobacco: Never Used   Substance and Sexual Activity   • Alcohol use: Yes     Alcohol/week: 1.2 - 3.6 oz     Types: 2 - 6 Cans of beer per week   • Drug use: No   • Sexual activity: Not on file   Lifestyle   • Physical activity:     Days per week: Not on file     Minutes per session: Not on file   • Stress: Not on file   Relationships   • Social connections:     Talks on phone: Not on file     Gets together: Not on file     Attends [employer] service: Not on file     Active member of club or organization: Not on file     Attends meetings of clubs or organizations: Not on file     Relationship status: Not on file   • Intimate partner violence:     Fear of current or ex partner: Not on file     Emotionally abused: Not on file     Physically abused: Not on file     Forced sexual activity: Not on file   Other Topics Concern   • Not on file   Social History Narrative   • Not on file       Current Outpatient Medications   Medication Sig Dispense Refill   • Dulaglutide (TRULICITY) 1.5 MG/0.5ML Solution Pen-injector Inject  as instructed.     • pioglitazone (ACTOS) 30 MG Tab Take 30 mg by mouth every day.     • piroxicam (FELDENE) 10 MG Cap Take 10 mg by mouth every day.     • Empagliflozin-Metformin HCl ER (SYNJARDY XR) 12.5-1000 MG TABLET SR 24 HR Take  by mouth.     • losartan-hydrochlorothiazide (HYZAAR) 50-12.5 MG per tablet Take 1 Tab by mouth every day.     • glimepiride (AMARYL) 2 MG Tab Take 2 mg by mouth every morning.       No current facility-administered medications for this visit.     \"CURRENT RX\"    ALLERGIES: Patient has no known allergies.    ROS    Constitutional: Denies fever, chills, sweats,  weight loss, fatigue  Cardiovascular: Denies chest pain, tightness, palpitations, swelling in legs/feet, fainting, difficulty breathing when lying down " "but gets better when sitting up.   Respiratory: Denies shortness of breath, cough, sputum, wheezing, painful breathing, coughing up blood.   Sleep: per HPI  Gastrointestinal: Denies  difficulty swallowing, nausea, abdominal pain, diarrhea, constipation, heartburn.  Musculoskeletal: Denies painful joints, sore muscles, back pain.        PHYSICAL EXAM  Obese    /62 (BP Location: Right arm, Patient Position: Sitting, BP Cuff Size: Large adult)   Pulse 80   Resp 16   Ht 1.854 m (6' 1\")   Wt 109.8 kg (242 lb)   SpO2 96%   BMI 31.93 kg/m²   Appearance: Well-nourished, well-developed, no acute distress  Eyes:  PERRLA, EOMI; glasses  ENMT: without lesions, deformities;hearing grossly intact; tongue normal, posterior pharynx without erythema or exudate; Mallampati classification: 4  Neck: Supple, trachea midline, no masses  Respiratory effort:  No intercostal retractions or use of accessory muscles  Lung auscultation:  No wheezes rhonchi rubs or rales  Cardiac: No murmurs, rubs, or gallops; regular rhythm, normal rate; no edema  Abdomen:  No tenderness, no organomegaly.  Obese  Musculoskeletal:  Grossly normal; gait and station normal; digits and nails normal  Skin:  No rashes, petechiae, cyanosis  Neurologic: without focal signs; oriented to person, time, place, and purpose; judgement intact  Psychiatric:  No depression, anxiety, agitation        PROBLEMS:    1. ROXANE (obstructive sleep apnea)    - DME Mask and Supplies    2. Obesity (BMI 30-39.9)    - OBESITY COUNSELING (No Charge): Patient identified as having weight management issue.  Appropriate orders and counseling given.    3. Smoker      4. Essential hypertension        PLAN:    F/U 6 months.    Has been advised to continue the current AutoPap, clean equipment frequently, and get new mask and supplies as allowed by insurance and DME. Advised about potential problems including nasal obstruction/stuffiness, mask leak or discomfort , frequent awakenings, " chill or dryness of the upper airway, noise, inconvenience, and lack of benefit. Recommend an earlier appointment, if significant treatment barriers develop.    Have advised the patient to follow up with the appropriate healthcare practitioners for all other medical problems and issues.

## 2019-12-10 ENCOUNTER — HOSPITAL ENCOUNTER (OUTPATIENT)
Dept: LAB | Facility: MEDICAL CENTER | Age: 69
End: 2019-12-10
Attending: FAMILY MEDICINE
Payer: COMMERCIAL

## 2019-12-10 LAB
ALBUMIN SERPL BCP-MCNC: 4.4 G/DL (ref 3.2–4.9)
ALBUMIN/GLOB SERPL: 1.8 G/DL
ALP SERPL-CCNC: 84 U/L (ref 30–99)
ALT SERPL-CCNC: 15 U/L (ref 2–50)
ANION GAP SERPL CALC-SCNC: 10 MMOL/L (ref 0–11.9)
AST SERPL-CCNC: 16 U/L (ref 12–45)
BILIRUB SERPL-MCNC: 1 MG/DL (ref 0.1–1.5)
BUN SERPL-MCNC: 15 MG/DL (ref 8–22)
CALCIUM SERPL-MCNC: 8.9 MG/DL (ref 8.5–10.5)
CHLORIDE SERPL-SCNC: 102 MMOL/L (ref 96–112)
CHOLEST SERPL-MCNC: 147 MG/DL (ref 100–199)
CO2 SERPL-SCNC: 27 MMOL/L (ref 20–33)
CREAT SERPL-MCNC: 1.02 MG/DL (ref 0.5–1.4)
EST. AVERAGE GLUCOSE BLD GHB EST-MCNC: 169 MG/DL
FASTING STATUS PATIENT QL REPORTED: NORMAL
GLOBULIN SER CALC-MCNC: 2.4 G/DL (ref 1.9–3.5)
GLUCOSE SERPL-MCNC: 94 MG/DL (ref 65–99)
HBA1C MFR BLD: 7.5 % (ref 0–5.6)
HDLC SERPL-MCNC: 49 MG/DL
LDLC SERPL CALC-MCNC: 84 MG/DL
POTASSIUM SERPL-SCNC: 4.1 MMOL/L (ref 3.6–5.5)
PROT SERPL-MCNC: 6.8 G/DL (ref 6–8.2)
SODIUM SERPL-SCNC: 139 MMOL/L (ref 135–145)
TRIGL SERPL-MCNC: 72 MG/DL (ref 0–149)

## 2019-12-10 PROCEDURE — 80053 COMPREHEN METABOLIC PANEL: CPT

## 2019-12-10 PROCEDURE — 36415 COLL VENOUS BLD VENIPUNCTURE: CPT

## 2019-12-10 PROCEDURE — 83036 HEMOGLOBIN GLYCOSYLATED A1C: CPT

## 2019-12-10 PROCEDURE — 80061 LIPID PANEL: CPT

## 2020-03-18 NOTE — PROGRESS NOTES
CC: Follow-up for ROXANE on AutoPap 5 to 20 cm water    HPI:  Mr. Darvin Jackson is a 69-year-old  last seen 8/21/2019 for ROXANE on auto titrating CPAP 5-15 cm water.  He was originally diagnosed to have sleep apnea in the 1980s and has been on CPAP since then.  He is a former patient of Dr. Beasley.    When to last seen his compliance was excellent and his average residual estimated apnea hypopnea index was 8.8.    Today, 3/19/2020, his 30-day compliance is 100% for 6 hours and 17 minutes.  He has an average residual apnea hypopnea index of 9.3.    The patient reports improved symptoms using positive airway pressure.  Has experienced no significant problems with mask fit, mask leak, pressure tolerance, excessive airway dryness, nasal congestion, aerophagia, or chest pain.    Significant comorbidities and modifying factors include tobacco abuse, obesity, prostate cancer, hypertension, diabetes, and up-to-date with influenza vaccination.       Patient Active Problem List    Diagnosis Date Noted   • Essential hypertension 08/21/2019   • ROXANE (obstructive sleep apnea) 02/24/2019   • Up to date with influenza vaccination 02/24/2019   • Smoker 02/24/2019   • Obesity (BMI 30-39.9) 10/24/2018       No past medical history on file.     No past surgical history on file.    Family History   Problem Relation Age of Onset   • Sleep Apnea Brother        Social History     Socioeconomic History   • Marital status:      Spouse name: Not on file   • Number of children: Not on file   • Years of education: Not on file   • Highest education level: Not on file   Occupational History   • Not on file   Social Needs   • Financial resource strain: Not on file   • Food insecurity     Worry: Not on file     Inability: Not on file   • Transportation needs     Medical: Not on file     Non-medical: Not on file   Tobacco Use   • Smoking status: Current Some Day Smoker     Types: Cigars   • Smokeless tobacco: Never Used  "  Substance and Sexual Activity   • Alcohol use: Yes     Alcohol/week: 1.2 - 3.6 oz     Types: 2 - 6 Cans of beer per week   • Drug use: No   • Sexual activity: Not on file   Lifestyle   • Physical activity     Days per week: Not on file     Minutes per session: Not on file   • Stress: Not on file   Relationships   • Social connections     Talks on phone: Not on file     Gets together: Not on file     Attends [employer]ism service: Not on file     Active member of club or organization: Not on file     Attends meetings of clubs or organizations: Not on file     Relationship status: Not on file   • Intimate partner violence     Fear of current or ex partner: Not on file     Emotionally abused: Not on file     Physically abused: Not on file     Forced sexual activity: Not on file   Other Topics Concern   • Not on file   Social History Narrative   • Not on file       Current Outpatient Medications   Medication Sig Dispense Refill   • Dulaglutide (TRULICITY) 1.5 MG/0.5ML Solution Pen-injector Inject  as instructed.     • pioglitazone (ACTOS) 30 MG Tab Take 30 mg by mouth every day.     • Empagliflozin-Metformin HCl ER (SYNJARDY XR) 12.5-1000 MG TABLET SR 24 HR Take  by mouth.     • losartan-hydrochlorothiazide (HYZAAR) 50-12.5 MG per tablet Take 1 Tab by mouth every day.     • glimepiride (AMARYL) 2 MG Tab Take 2 mg by mouth every morning.     • piroxicam (FELDENE) 10 MG Cap Take 10 mg by mouth every day.       No current facility-administered medications for this visit.     \"CURRENT RX\"    ALLERGIES: Patient has no known allergies.    ROS    Constitutional: Denies fever, chills, sweats,  weight loss, fatigue  Cardiovascular: Denies chest pain, tightness, palpitations, swelling in legs/feet, fainting, difficulty breathing when lying down but gets better when sitting up.   Respiratory: Denies shortness of breath, cough, sputum, wheezing, painful breathing, coughing up blood.   Sleep: per HPI  Gastrointestinal: Denies  " "difficulty swallowing, nausea, abdominal pain, diarrhea, constipation, heartburn.  Musculoskeletal: Denies painful joints, sore muscles, back pain.        PHYSICAL EXAM  Obese    /74 (BP Location: Right arm, Patient Position: Sitting, BP Cuff Size: Adult)   Pulse 97   Resp 16   Ht 1.854 m (6' 1\")   Wt 109.8 kg (242 lb)   SpO2 94%   BMI 31.93 kg/m²   Appearance: Well-nourished, well-developed, no acute distress  Eyes:  PERRLA, EOMI  ENMT: without lesions, deformities;hearing grossly intact; tongue normal, posterior pharynx without erythema or exudate;   Modified Mallampati (AKA Freidman) Score:  Neck: Supple, trachea midline, no masses  Respiratory effort:  No intercostal retractions or use of accessory muscles  Lung auscultation:  No wheezes rhonchi rubs or rales  Cardiac: No murmurs, rubs, or gallops; regular rhythm, normal rate; no edema  Abdomen:  No tenderness, no organomegaly.  Obese  Musculoskeletal:  Grossly normal; gait and station normal; digits and nails normal  Skin:  No rashes, petechiae, cyanosis  Neurologic: without focal signs; oriented to person, time, place, and purpose; judgement intact  Psychiatric:  No depression, anxiety, agitation        PROBLEMS:  1. ROXANE (obstructive sleep apnea)    - DME Mask and Supplies    2. Smoker      3. Up to date with influenza vaccination      4. Obesity (BMI 30-39.9)    - OBESITY COUNSELING (No Charge): Patient identified as having weight management issue.  Appropriate orders and counseling given.    5. Essential hypertension      PLAN:     Has been advised to continue the current AutoPap 5 to 20 cm water, clean equipment frequently, and get new mask and supplies as allowed by insurance and DME. Advised about potential problems including nasal obstruction/stuffiness, mask leak or discomfort , frequent awakenings, chill or dryness of the upper airway, noise, inconvenience, and lack of benefit. Recommend an earlier appointment, if significant treatment " barriers develop.    Have advised the patient to follow up with the appropriate healthcare practitioners for all other medical problems and issues.    Return in about 1 year (around 3/19/2021).

## 2020-03-19 ENCOUNTER — SLEEP CENTER VISIT (OUTPATIENT)
Dept: SLEEP MEDICINE | Facility: MEDICAL CENTER | Age: 70
End: 2020-03-19
Payer: COMMERCIAL

## 2020-03-19 VITALS
SYSTOLIC BLOOD PRESSURE: 120 MMHG | BODY MASS INDEX: 32.07 KG/M2 | OXYGEN SATURATION: 94 % | DIASTOLIC BLOOD PRESSURE: 74 MMHG | HEART RATE: 97 BPM | WEIGHT: 242 LBS | HEIGHT: 73 IN | RESPIRATION RATE: 16 BRPM

## 2020-03-19 DIAGNOSIS — Z92.29 UP TO DATE WITH INFLUENZA VACCINATION: ICD-10-CM

## 2020-03-19 DIAGNOSIS — E66.9 OBESITY (BMI 30-39.9): ICD-10-CM

## 2020-03-19 DIAGNOSIS — G47.33 OSA (OBSTRUCTIVE SLEEP APNEA): ICD-10-CM

## 2020-03-19 DIAGNOSIS — F17.200 SMOKER: ICD-10-CM

## 2020-03-19 DIAGNOSIS — I10 ESSENTIAL HYPERTENSION: ICD-10-CM

## 2020-03-19 PROCEDURE — 99214 OFFICE O/P EST MOD 30 MIN: CPT | Performed by: INTERNAL MEDICINE

## 2020-03-19 ASSESSMENT — FIBROSIS 4 INDEX: FIB4 SCORE: 1.1

## 2020-04-10 ENCOUNTER — HOSPITAL ENCOUNTER (OUTPATIENT)
Dept: LAB | Facility: MEDICAL CENTER | Age: 70
End: 2020-04-10
Attending: FAMILY MEDICINE
Payer: COMMERCIAL

## 2020-04-10 LAB
ALBUMIN SERPL BCP-MCNC: 4.4 G/DL (ref 3.2–4.9)
ALBUMIN/GLOB SERPL: 1.6 G/DL
ALP SERPL-CCNC: 90 U/L (ref 30–99)
ALT SERPL-CCNC: 22 U/L (ref 2–50)
ANION GAP SERPL CALC-SCNC: 13 MMOL/L (ref 7–16)
AST SERPL-CCNC: 22 U/L (ref 12–45)
BASOPHILS # BLD AUTO: 0.6 % (ref 0–1.8)
BASOPHILS # BLD: 0.04 K/UL (ref 0–0.12)
BILIRUB SERPL-MCNC: 0.8 MG/DL (ref 0.1–1.5)
BUN SERPL-MCNC: 16 MG/DL (ref 8–22)
CALCIUM SERPL-MCNC: 9.1 MG/DL (ref 8.5–10.5)
CHLORIDE SERPL-SCNC: 99 MMOL/L (ref 96–112)
CO2 SERPL-SCNC: 26 MMOL/L (ref 20–33)
CREAT SERPL-MCNC: 0.98 MG/DL (ref 0.5–1.4)
EOSINOPHIL # BLD AUTO: 0.08 K/UL (ref 0–0.51)
EOSINOPHIL NFR BLD: 1.2 % (ref 0–6.9)
ERYTHROCYTE [DISTWIDTH] IN BLOOD BY AUTOMATED COUNT: 47.7 FL (ref 35.9–50)
EST. AVERAGE GLUCOSE BLD GHB EST-MCNC: 183 MG/DL
FASTING STATUS PATIENT QL REPORTED: NORMAL
GLOBULIN SER CALC-MCNC: 2.8 G/DL (ref 1.9–3.5)
GLUCOSE SERPL-MCNC: 100 MG/DL (ref 65–99)
HBA1C MFR BLD: 8 % (ref 0–5.6)
HCT VFR BLD AUTO: 47.4 % (ref 42–52)
HGB BLD-MCNC: 15.7 G/DL (ref 14–18)
IMM GRANULOCYTES # BLD AUTO: 0.02 K/UL (ref 0–0.11)
IMM GRANULOCYTES NFR BLD AUTO: 0.3 % (ref 0–0.9)
LYMPHOCYTES # BLD AUTO: 1.85 K/UL (ref 1–4.8)
LYMPHOCYTES NFR BLD: 28.6 % (ref 22–41)
MCH RBC QN AUTO: 30.5 PG (ref 27–33)
MCHC RBC AUTO-ENTMCNC: 33.1 G/DL (ref 33.7–35.3)
MCV RBC AUTO: 92.2 FL (ref 81.4–97.8)
MONOCYTES # BLD AUTO: 0.5 K/UL (ref 0–0.85)
MONOCYTES NFR BLD AUTO: 7.7 % (ref 0–13.4)
NEUTROPHILS # BLD AUTO: 3.98 K/UL (ref 1.82–7.42)
NEUTROPHILS NFR BLD: 61.6 % (ref 44–72)
NRBC # BLD AUTO: 0 K/UL
NRBC BLD-RTO: 0 /100 WBC
PLATELET # BLD AUTO: 243 K/UL (ref 164–446)
PMV BLD AUTO: 10.4 FL (ref 9–12.9)
POTASSIUM SERPL-SCNC: 4.3 MMOL/L (ref 3.6–5.5)
PROT SERPL-MCNC: 7.2 G/DL (ref 6–8.2)
PSA SERPL-MCNC: <0.14 NG/ML (ref 0–4)
RBC # BLD AUTO: 5.14 M/UL (ref 4.7–6.1)
SODIUM SERPL-SCNC: 138 MMOL/L (ref 135–145)
WBC # BLD AUTO: 6.5 K/UL (ref 4.8–10.8)

## 2020-04-10 PROCEDURE — 84153 ASSAY OF PSA TOTAL: CPT

## 2020-04-10 PROCEDURE — 83036 HEMOGLOBIN GLYCOSYLATED A1C: CPT

## 2020-04-10 PROCEDURE — 85025 COMPLETE CBC W/AUTO DIFF WBC: CPT

## 2020-04-10 PROCEDURE — 80053 COMPREHEN METABOLIC PANEL: CPT

## 2020-04-10 PROCEDURE — 36415 COLL VENOUS BLD VENIPUNCTURE: CPT

## 2020-07-30 ENCOUNTER — HOSPITAL ENCOUNTER (OUTPATIENT)
Dept: LAB | Facility: MEDICAL CENTER | Age: 70
End: 2020-07-30
Attending: FAMILY MEDICINE
Payer: COMMERCIAL

## 2020-07-30 LAB
ALBUMIN SERPL BCP-MCNC: 4.2 G/DL (ref 3.2–4.9)
ALBUMIN/GLOB SERPL: 1.4 G/DL
ALP SERPL-CCNC: 89 U/L (ref 30–99)
ALT SERPL-CCNC: 16 U/L (ref 2–50)
ANION GAP SERPL CALC-SCNC: 13 MMOL/L (ref 7–16)
AST SERPL-CCNC: 15 U/L (ref 12–45)
BILIRUB SERPL-MCNC: 0.3 MG/DL (ref 0.1–1.5)
BUN SERPL-MCNC: 21 MG/DL (ref 8–22)
CALCIUM SERPL-MCNC: 9.5 MG/DL (ref 8.5–10.5)
CHLORIDE SERPL-SCNC: 99 MMOL/L (ref 96–112)
CHOLEST SERPL-MCNC: 156 MG/DL (ref 100–199)
CO2 SERPL-SCNC: 26 MMOL/L (ref 20–33)
CREAT SERPL-MCNC: 0.75 MG/DL (ref 0.5–1.4)
EST. AVERAGE GLUCOSE BLD GHB EST-MCNC: 192 MG/DL
FASTING STATUS PATIENT QL REPORTED: NORMAL
GLOBULIN SER CALC-MCNC: 2.9 G/DL (ref 1.9–3.5)
GLUCOSE SERPL-MCNC: 93 MG/DL (ref 65–99)
HBA1C MFR BLD: 8.3 % (ref 0–5.6)
HDLC SERPL-MCNC: 49 MG/DL
LDLC SERPL CALC-MCNC: 90 MG/DL
POTASSIUM SERPL-SCNC: 4.4 MMOL/L (ref 3.6–5.5)
PROT SERPL-MCNC: 7.1 G/DL (ref 6–8.2)
SODIUM SERPL-SCNC: 138 MMOL/L (ref 135–145)
TRIGL SERPL-MCNC: 84 MG/DL (ref 0–149)

## 2020-07-30 PROCEDURE — 80053 COMPREHEN METABOLIC PANEL: CPT

## 2020-07-30 PROCEDURE — 80061 LIPID PANEL: CPT

## 2020-07-30 PROCEDURE — 83036 HEMOGLOBIN GLYCOSYLATED A1C: CPT

## 2020-07-30 PROCEDURE — 36415 COLL VENOUS BLD VENIPUNCTURE: CPT

## 2020-10-28 ENCOUNTER — HOSPITAL ENCOUNTER (OUTPATIENT)
Dept: LAB | Facility: MEDICAL CENTER | Age: 70
End: 2020-10-28
Attending: FAMILY MEDICINE
Payer: COMMERCIAL

## 2020-10-28 LAB
ALBUMIN SERPL BCP-MCNC: 4.4 G/DL (ref 3.2–4.9)
ALBUMIN/GLOB SERPL: 1.6 G/DL
ALP SERPL-CCNC: 93 U/L (ref 30–99)
ALT SERPL-CCNC: 24 U/L (ref 2–50)
ANION GAP SERPL CALC-SCNC: 9 MMOL/L (ref 7–16)
AST SERPL-CCNC: 20 U/L (ref 12–45)
BILIRUB SERPL-MCNC: 0.6 MG/DL (ref 0.1–1.5)
BUN SERPL-MCNC: 20 MG/DL (ref 8–22)
CALCIUM SERPL-MCNC: 9.4 MG/DL (ref 8.5–10.5)
CHLORIDE SERPL-SCNC: 99 MMOL/L (ref 96–112)
CO2 SERPL-SCNC: 29 MMOL/L (ref 20–33)
CREAT SERPL-MCNC: 0.82 MG/DL (ref 0.5–1.4)
EST. AVERAGE GLUCOSE BLD GHB EST-MCNC: 177 MG/DL
GLOBULIN SER CALC-MCNC: 2.8 G/DL (ref 1.9–3.5)
GLUCOSE SERPL-MCNC: 100 MG/DL (ref 65–99)
HBA1C MFR BLD: 7.8 % (ref 0–5.6)
POTASSIUM SERPL-SCNC: 3.9 MMOL/L (ref 3.6–5.5)
PROT SERPL-MCNC: 7.2 G/DL (ref 6–8.2)
SODIUM SERPL-SCNC: 137 MMOL/L (ref 135–145)

## 2020-10-28 PROCEDURE — 80053 COMPREHEN METABOLIC PANEL: CPT

## 2020-10-28 PROCEDURE — 83036 HEMOGLOBIN GLYCOSYLATED A1C: CPT

## 2020-10-28 PROCEDURE — 36415 COLL VENOUS BLD VENIPUNCTURE: CPT

## 2021-02-03 ENCOUNTER — HOSPITAL ENCOUNTER (OUTPATIENT)
Dept: LAB | Facility: MEDICAL CENTER | Age: 71
End: 2021-02-03
Attending: FAMILY MEDICINE
Payer: COMMERCIAL

## 2021-02-03 LAB
ALBUMIN SERPL BCP-MCNC: 4.5 G/DL (ref 3.2–4.9)
ALBUMIN/GLOB SERPL: 1.6 G/DL
ALP SERPL-CCNC: 89 U/L (ref 30–99)
ALT SERPL-CCNC: 22 U/L (ref 2–50)
ANION GAP SERPL CALC-SCNC: 9 MMOL/L (ref 7–16)
AST SERPL-CCNC: 22 U/L (ref 12–45)
BILIRUB SERPL-MCNC: 0.6 MG/DL (ref 0.1–1.5)
BUN SERPL-MCNC: 15 MG/DL (ref 8–22)
CALCIUM SERPL-MCNC: 9.4 MG/DL (ref 8.5–10.5)
CHLORIDE SERPL-SCNC: 99 MMOL/L (ref 96–112)
CHOLEST SERPL-MCNC: 164 MG/DL (ref 100–199)
CO2 SERPL-SCNC: 28 MMOL/L (ref 20–33)
CREAT SERPL-MCNC: 0.8 MG/DL (ref 0.5–1.4)
EST. AVERAGE GLUCOSE BLD GHB EST-MCNC: 183 MG/DL
GLOBULIN SER CALC-MCNC: 2.9 G/DL (ref 1.9–3.5)
GLUCOSE SERPL-MCNC: 114 MG/DL (ref 65–99)
HBA1C MFR BLD: 8 % (ref 0–5.6)
HDLC SERPL-MCNC: 49 MG/DL
LDLC SERPL CALC-MCNC: 98 MG/DL
POTASSIUM SERPL-SCNC: 5 MMOL/L (ref 3.6–5.5)
PROT SERPL-MCNC: 7.4 G/DL (ref 6–8.2)
SODIUM SERPL-SCNC: 136 MMOL/L (ref 135–145)
TRIGL SERPL-MCNC: 85 MG/DL (ref 0–149)

## 2021-02-03 PROCEDURE — 83036 HEMOGLOBIN GLYCOSYLATED A1C: CPT

## 2021-02-03 PROCEDURE — 80061 LIPID PANEL: CPT

## 2021-02-03 PROCEDURE — 80053 COMPREHEN METABOLIC PANEL: CPT

## 2021-02-03 PROCEDURE — 36415 COLL VENOUS BLD VENIPUNCTURE: CPT

## 2021-02-04 ENCOUNTER — TELEPHONE (OUTPATIENT)
Dept: SLEEP MEDICINE | Facility: MEDICAL CENTER | Age: 71
End: 2021-02-04

## 2021-02-04 NOTE — TELEPHONE ENCOUNTER
Patient called and stated that [facility] is asking for last office notes - he was switched to [facility] last year but patient was never contacted.  Will fax last note - and demo's

## 2021-03-03 DIAGNOSIS — Z23 NEED FOR VACCINATION: ICD-10-CM

## 2021-03-11 ENCOUNTER — TELEMEDICINE (OUTPATIENT)
Dept: SLEEP MEDICINE | Facility: MEDICAL CENTER | Age: 71
End: 2021-03-11
Payer: COMMERCIAL

## 2021-03-11 VITALS — BODY MASS INDEX: 32.47 KG/M2 | HEIGHT: 73 IN | WEIGHT: 245 LBS

## 2021-03-11 DIAGNOSIS — F17.200 CURRENT SMOKER: ICD-10-CM

## 2021-03-11 DIAGNOSIS — G47.33 OSA (OBSTRUCTIVE SLEEP APNEA): ICD-10-CM

## 2021-03-11 DIAGNOSIS — I10 ESSENTIAL HYPERTENSION: ICD-10-CM

## 2021-03-11 PROCEDURE — 99214 OFFICE O/P EST MOD 30 MIN: CPT | Mod: 95,CR | Performed by: NURSE PRACTITIONER

## 2021-03-11 ASSESSMENT — FIBROSIS 4 INDEX: FIB4 SCORE: 1.31

## 2021-03-11 NOTE — PROGRESS NOTES
Virtual Visit: Established Patient   This visit was conducted via Zoom using secure and encrypted videoconferencing technology. The patient was in a private location in the state of [place].    The patient's identity was confirmed and verbal consent was obtained for this virtual visit.    Subjective:   CC:   Chief Complaint   Patient presents with   • Apnea       Darvin Jackson is a 70 y.o. male presenting for evaluation and management of:    ROXANE  Last OV 3/19/20 with Dr. Martin    Currently using APAP 5-20cm. Current device obtained 2018.   Compliance report 2/9/21-3/10/21 indicates 100% compliance, avg nightly use of 6.5hrs, mean pressure 12.1cm, no mask leak with reduced AHI 9.6/hr. Reviewed with patient.  He has lost 10lbs since 2019.  He tolerates nasal mask and pressure well; no AM headaches; daytime energy levels are consistent; no napping; he feels overall he sleeps well. He denies cardiac or respiratory symptoms. No significant allergies. He denies GERD.    SLEEP HX:  HST 2018:  Prior diagnosis in 1980's and previous Dr. Beasley patient.   1.Sever obstructive sleep apnea with  Respiratory Event Index (GRICEL) of 82.0 per hour. These findings are based on the recording time (flow evaluation time). It is not possible with this device to determine a traditional apnea+hypopnea index (AHI) for total sleep time since EEG channels are not available.      2.  O2 Sat. hiram was 69% and baseline O2 at 98 %. O2 sat was below 89% for 123 min of the flow evaluation time. Avg HR 60 BPM.       ROS in HPI; otherwise negative.    No Known Allergies    Current medicines (including changes today)  Current Outpatient Medications   Medication Sig Dispense Refill   • Dulaglutide (TRULICITY) 1.5 MG/0.5ML Solution Pen-injector Inject  as instructed.     • pioglitazone (ACTOS) 30 MG Tab Take 30 mg by mouth every day.     • piroxicam (FELDENE) 10 MG Cap Take 10 mg by mouth every day.     • losartan-hydrochlorothiazide (HYZAAR)  "50-12.5 MG per tablet Take 1 Tab by mouth every day.     • glimepiride (AMARYL) 2 MG Tab Take 2 mg by mouth every morning.       No current facility-administered medications for this visit.       Patient Active Problem List    Diagnosis Date Noted   • Essential hypertension 08/21/2019   • ROXANE (obstructive sleep apnea) 02/24/2019   • Up to date with influenza vaccination 02/24/2019   • Smoker 02/24/2019   • Obesity (BMI 30-39.9) 10/24/2018       Family History   Problem Relation Age of Onset   • Sleep Apnea Brother        He  has no past medical history on file.  He  has no past surgical history on file.       Objective:   Ht 1.854 m (6' 1\")   Wt 111 kg (245 lb)   BMI 32.32 kg/m²     Physical Exam:  Constitutional: Alert, no distress, well-groomed.  Skin: No rashes in visible areas.  Eye: Round. Conjunctiva clear, lids normal. No icterus. Glasses.  ENMT: Lips pink without lesions, good dentition, moist mucous membranes. Phonation normal.  Neck: No masses, no thyromegaly. Moves freely without pain.  Respiratory: Unlabored respiratory effort, no cough or audible wheeze  Psych: Alert and oriented x3, normal affect and mood.       Assessment and Plan:   The following treatment plan was discussed:     1. ROXANE (obstructive sleep apnea)    2. BMI 32.0-32.9,adult  - Height And Weight    3. Current smoker    4. Essential hypertension    Sleep apnea is well controlled; continue with current CPAP settings. Consider Resmed model when able to upgrade in the future per patient preference.  DME mask/supplies.  Discussed sleep hygiene  Encouraged weight loss through diet/exercise  Smoking cessation strongly encouraged  F/u with PCP for other health concerns and ongoing management of BP    Follow-up: 1 year with compliance report, sooner if needed.           "

## 2021-09-13 ENCOUNTER — HOSPITAL ENCOUNTER (OUTPATIENT)
Dept: LAB | Facility: MEDICAL CENTER | Age: 71
End: 2021-09-13
Attending: FAMILY MEDICINE
Payer: COMMERCIAL

## 2021-09-13 LAB
ALBUMIN SERPL BCP-MCNC: 4.1 G/DL (ref 3.2–4.9)
ALBUMIN/GLOB SERPL: 1.3 G/DL
ALP SERPL-CCNC: 106 U/L (ref 30–99)
ALT SERPL-CCNC: 16 U/L (ref 2–50)
ANION GAP SERPL CALC-SCNC: 11 MMOL/L (ref 7–16)
AST SERPL-CCNC: 17 U/L (ref 12–45)
BASOPHILS # BLD AUTO: 0.5 % (ref 0–1.8)
BASOPHILS # BLD: 0.04 K/UL (ref 0–0.12)
BILIRUB SERPL-MCNC: 0.6 MG/DL (ref 0.1–1.5)
BUN SERPL-MCNC: 15 MG/DL (ref 8–22)
CALCIUM SERPL-MCNC: 9.2 MG/DL (ref 8.5–10.5)
CHLORIDE SERPL-SCNC: 101 MMOL/L (ref 96–112)
CHOLEST SERPL-MCNC: 162 MG/DL (ref 100–199)
CO2 SERPL-SCNC: 26 MMOL/L (ref 20–33)
CREAT SERPL-MCNC: 0.86 MG/DL (ref 0.5–1.4)
EOSINOPHIL # BLD AUTO: 0.17 K/UL (ref 0–0.51)
EOSINOPHIL NFR BLD: 2.3 % (ref 0–6.9)
ERYTHROCYTE [DISTWIDTH] IN BLOOD BY AUTOMATED COUNT: 47.5 FL (ref 35.9–50)
EST. AVERAGE GLUCOSE BLD GHB EST-MCNC: 206 MG/DL
FASTING STATUS PATIENT QL REPORTED: NORMAL
GLOBULIN SER CALC-MCNC: 3.1 G/DL (ref 1.9–3.5)
GLUCOSE SERPL-MCNC: 105 MG/DL (ref 65–99)
HBA1C MFR BLD: 8.8 % (ref 4–5.6)
HCT VFR BLD AUTO: 48.1 % (ref 42–52)
HDLC SERPL-MCNC: 48 MG/DL
HGB BLD-MCNC: 15.5 G/DL (ref 14–18)
IMM GRANULOCYTES # BLD AUTO: 0.04 K/UL (ref 0–0.11)
IMM GRANULOCYTES NFR BLD AUTO: 0.5 % (ref 0–0.9)
LDLC SERPL CALC-MCNC: 93 MG/DL
LYMPHOCYTES # BLD AUTO: 1.99 K/UL (ref 1–4.8)
LYMPHOCYTES NFR BLD: 26.9 % (ref 22–41)
MCH RBC QN AUTO: 30 PG (ref 27–33)
MCHC RBC AUTO-ENTMCNC: 32.2 G/DL (ref 33.7–35.3)
MCV RBC AUTO: 93.2 FL (ref 81.4–97.8)
MONOCYTES # BLD AUTO: 0.61 K/UL (ref 0–0.85)
MONOCYTES NFR BLD AUTO: 8.2 % (ref 0–13.4)
NEUTROPHILS # BLD AUTO: 4.56 K/UL (ref 1.82–7.42)
NEUTROPHILS NFR BLD: 61.6 % (ref 44–72)
NRBC # BLD AUTO: 0 K/UL
NRBC BLD-RTO: 0 /100 WBC
PLATELET # BLD AUTO: 224 K/UL (ref 164–446)
PMV BLD AUTO: 10.8 FL (ref 9–12.9)
POTASSIUM SERPL-SCNC: 3.7 MMOL/L (ref 3.6–5.5)
PROT SERPL-MCNC: 7.2 G/DL (ref 6–8.2)
RBC # BLD AUTO: 5.16 M/UL (ref 4.7–6.1)
SODIUM SERPL-SCNC: 138 MMOL/L (ref 135–145)
TRIGL SERPL-MCNC: 106 MG/DL (ref 0–149)
WBC # BLD AUTO: 7.4 K/UL (ref 4.8–10.8)

## 2021-09-13 PROCEDURE — 36415 COLL VENOUS BLD VENIPUNCTURE: CPT

## 2021-09-13 PROCEDURE — 80053 COMPREHEN METABOLIC PANEL: CPT

## 2021-09-13 PROCEDURE — 80061 LIPID PANEL: CPT

## 2021-09-13 PROCEDURE — 83036 HEMOGLOBIN GLYCOSYLATED A1C: CPT

## 2021-09-13 PROCEDURE — 85025 COMPLETE CBC W/AUTO DIFF WBC: CPT

## 2021-12-18 ENCOUNTER — HOSPITAL ENCOUNTER (OUTPATIENT)
Dept: LAB | Facility: MEDICAL CENTER | Age: 71
End: 2021-12-18
Attending: FAMILY MEDICINE
Payer: COMMERCIAL

## 2021-12-18 LAB
ALBUMIN SERPL BCP-MCNC: 4.5 G/DL (ref 3.2–4.9)
ALBUMIN/GLOB SERPL: 1.6 G/DL
ALP SERPL-CCNC: 94 U/L (ref 30–99)
ALT SERPL-CCNC: 16 U/L (ref 2–50)
ANION GAP SERPL CALC-SCNC: 10 MMOL/L (ref 7–16)
AST SERPL-CCNC: 14 U/L (ref 12–45)
BILIRUB SERPL-MCNC: 0.7 MG/DL (ref 0.1–1.5)
BUN SERPL-MCNC: 16 MG/DL (ref 8–22)
CALCIUM SERPL-MCNC: 9.3 MG/DL (ref 8.5–10.5)
CHLORIDE SERPL-SCNC: 102 MMOL/L (ref 96–112)
CHOLEST SERPL-MCNC: 172 MG/DL (ref 100–199)
CO2 SERPL-SCNC: 28 MMOL/L (ref 20–33)
CREAT SERPL-MCNC: 0.81 MG/DL (ref 0.5–1.4)
EST. AVERAGE GLUCOSE BLD GHB EST-MCNC: 229 MG/DL
FASTING STATUS PATIENT QL REPORTED: NORMAL
GLOBULIN SER CALC-MCNC: 2.8 G/DL (ref 1.9–3.5)
GLUCOSE SERPL-MCNC: 96 MG/DL (ref 65–99)
HBA1C MFR BLD: 9.6 % (ref 4–5.6)
HDLC SERPL-MCNC: 61 MG/DL
LDLC SERPL CALC-MCNC: 98 MG/DL
POTASSIUM SERPL-SCNC: 3.7 MMOL/L (ref 3.6–5.5)
PROT SERPL-MCNC: 7.3 G/DL (ref 6–8.2)
SODIUM SERPL-SCNC: 140 MMOL/L (ref 135–145)
TRIGL SERPL-MCNC: 63 MG/DL (ref 0–149)

## 2021-12-18 PROCEDURE — 80053 COMPREHEN METABOLIC PANEL: CPT

## 2021-12-18 PROCEDURE — 36415 COLL VENOUS BLD VENIPUNCTURE: CPT

## 2021-12-18 PROCEDURE — 80061 LIPID PANEL: CPT

## 2021-12-18 PROCEDURE — 83036 HEMOGLOBIN GLYCOSYLATED A1C: CPT

## 2022-03-23 ENCOUNTER — HOSPITAL ENCOUNTER (OUTPATIENT)
Dept: LAB | Facility: MEDICAL CENTER | Age: 72
End: 2022-03-23
Attending: FAMILY MEDICINE
Payer: COMMERCIAL

## 2022-03-23 LAB
ALBUMIN SERPL BCP-MCNC: 4.4 G/DL (ref 3.2–4.9)
ALBUMIN/GLOB SERPL: 1.8 G/DL
ALP SERPL-CCNC: 91 U/L (ref 30–99)
ALT SERPL-CCNC: 16 U/L (ref 2–50)
ANION GAP SERPL CALC-SCNC: 15 MMOL/L (ref 7–16)
AST SERPL-CCNC: 16 U/L (ref 12–45)
BILIRUB SERPL-MCNC: 0.4 MG/DL (ref 0.1–1.5)
BUN SERPL-MCNC: 17 MG/DL (ref 8–22)
CALCIUM SERPL-MCNC: 9.5 MG/DL (ref 8.5–10.5)
CHLORIDE SERPL-SCNC: 104 MMOL/L (ref 96–112)
CHOLEST SERPL-MCNC: 176 MG/DL (ref 100–199)
CO2 SERPL-SCNC: 22 MMOL/L (ref 20–33)
CREAT SERPL-MCNC: 0.86 MG/DL (ref 0.5–1.4)
EST. AVERAGE GLUCOSE BLD GHB EST-MCNC: 220 MG/DL
FASTING STATUS PATIENT QL REPORTED: NORMAL
GFR SERPLBLD CREATININE-BSD FMLA CKD-EPI: 93 ML/MIN/1.73 M 2
GLOBULIN SER CALC-MCNC: 2.5 G/DL (ref 1.9–3.5)
GLUCOSE SERPL-MCNC: 101 MG/DL (ref 65–99)
HBA1C MFR BLD: 9.3 % (ref 4–5.6)
HDLC SERPL-MCNC: 53 MG/DL
LDLC SERPL CALC-MCNC: 106 MG/DL
POTASSIUM SERPL-SCNC: 3.8 MMOL/L (ref 3.6–5.5)
PROT SERPL-MCNC: 6.9 G/DL (ref 6–8.2)
SODIUM SERPL-SCNC: 141 MMOL/L (ref 135–145)
TRIGL SERPL-MCNC: 83 MG/DL (ref 0–149)

## 2022-03-23 PROCEDURE — 80061 LIPID PANEL: CPT

## 2022-03-23 PROCEDURE — 36415 COLL VENOUS BLD VENIPUNCTURE: CPT

## 2022-03-23 PROCEDURE — 83036 HEMOGLOBIN GLYCOSYLATED A1C: CPT

## 2022-03-23 PROCEDURE — 80053 COMPREHEN METABOLIC PANEL: CPT

## 2022-05-04 ENCOUNTER — OFFICE VISIT (OUTPATIENT)
Dept: SLEEP MEDICINE | Facility: MEDICAL CENTER | Age: 72
End: 2022-05-04
Payer: COMMERCIAL

## 2022-05-04 VITALS
DIASTOLIC BLOOD PRESSURE: 74 MMHG | BODY MASS INDEX: 34.06 KG/M2 | OXYGEN SATURATION: 95 % | HEIGHT: 73 IN | HEART RATE: 64 BPM | WEIGHT: 257 LBS | SYSTOLIC BLOOD PRESSURE: 140 MMHG | RESPIRATION RATE: 16 BRPM

## 2022-05-04 DIAGNOSIS — G47.33 OSA (OBSTRUCTIVE SLEEP APNEA): ICD-10-CM

## 2022-05-04 DIAGNOSIS — I10 ESSENTIAL HYPERTENSION: ICD-10-CM

## 2022-05-04 DIAGNOSIS — F17.200 CURRENT SMOKER: ICD-10-CM

## 2022-05-04 PROCEDURE — 99213 OFFICE O/P EST LOW 20 MIN: CPT | Performed by: NURSE PRACTITIONER

## 2022-05-04 RX ORDER — SILDENAFIL 100 MG/1
TABLET, FILM COATED ORAL
COMMUNITY
Start: 2022-04-07 | End: 2023-05-01 | Stop reason: SDUPTHER

## 2022-05-04 RX ORDER — INSULIN GLARGINE AND LIXISENATIDE 100; 33 U/ML; UG/ML
26 INJECTION, SOLUTION SUBCUTANEOUS
COMMUNITY
Start: 2022-04-22 | End: 2023-05-01 | Stop reason: SDUPTHER

## 2022-05-04 RX ORDER — EMPAGLIFLOZIN AND METFORMIN HYDROCHLORIDE 12.5; 1 MG/1; MG/1
1 TABLET ORAL 2 TIMES DAILY
COMMUNITY
Start: 2022-04-28 | End: 2023-05-01 | Stop reason: SDUPTHER

## 2022-05-04 RX ORDER — FELODIPINE 10 MG/1
10 TABLET, EXTENDED RELEASE ORAL DAILY
COMMUNITY
Start: 2022-04-27 | End: 2023-04-19

## 2022-05-04 ASSESSMENT — FIBROSIS 4 INDEX: FIB4 SCORE: 1.232142857142857143

## 2022-05-04 ASSESSMENT — PATIENT HEALTH QUESTIONNAIRE - PHQ9: CLINICAL INTERPRETATION OF PHQ2 SCORE: 0

## 2022-05-04 NOTE — PROGRESS NOTES
Chief Complaint   Patient presents with   • Follow-Up     last seen 3/11/2021       HPI:  Darvin Jackson is a 71 y.o. year old male here today for follow-up on ROXANE  PMH HTN, obesity, current smoker.  Last OV 3/11/21     Currently using APAP @ 5-20cm H20 nightly; RESPIRONICS; device obtained 2018.  Recall discussed and handout provided; he denies any issues noted in recall and will register device.  Compliance report 4/4/2022 through 5/3/2022 indicates 100% compliance, average nightly use 6 hours 26 minutes, mean pressure 12.8 cm, minimal mask leak with an overall AHI of 10.6/h.  Reviewed with patient.  He notes sleeping well on device. He wakes at 5:30am and goes to bed around 10:30pm. He denies AM headaches, energy levels are consistent; no napping. He denies cardiac or respiratory symptoms. No major changes in health over the last year. He would like the RAMP pressure increased slightly.  He is due for pneumovax 23 and will obtain from [facility].    SLEEP HX:  HST 2018:  Prior diagnosis in 1980's and previous Dr. Beasley patient.   1.Sever obstructive sleep apnea with  Respiratory Event Index (GRICEL) of 82.0 per hour. These findings are based on the recording time (flow evaluation time). It is not possible with this device to determine a traditional apnea+hypopnea index (AHI) for total sleep time since EEG channels are not available.   2.  O2 Sat. hiram was 69% and baseline O2 at 98 %. O2 sat was below 89% for 123 min of the flow evaluation time. Avg HR 60 BPM.      ROS: As per HPI and otherwise negative if not stated.    History reviewed. No pertinent past medical history.    History reviewed. No pertinent surgical history.    Family History   Problem Relation Age of Onset   • Sleep Apnea Brother        Social History     Socioeconomic History   • Marital status:      Spouse name: Not on file   • Number of children: Not on file   • Years of education: Not on file   • Highest education level: Not on  "file   Occupational History   • Not on file   Tobacco Use   • Smoking status: Current Some Day Smoker     Types: Cigars   • Smokeless tobacco: Never Used   Substance and Sexual Activity   • Alcohol use: Yes     Alcohol/week: 1.2 - 3.6 oz     Types: 2 - 6 Cans of beer per week   • Drug use: No   • Sexual activity: Not on file   Other Topics Concern   • Not on file   Social History Narrative   • Not on file     Social Determinants of Health     Financial Resource Strain: Not on file   Food Insecurity: Not on file   Transportation Needs: Not on file   Physical Activity: Not on file   Stress: Not on file   Social Connections: Not on file   Intimate Partner Violence: Not on file   Housing Stability: Not on file       Allergies as of 05/04/2022   • (No Known Allergies)        Vitals:  /74 (BP Location: Left arm, Patient Position: Sitting, BP Cuff Size: Adult)   Pulse 64   Resp 16   Ht 1.854 m (6' 1\")   Wt 117 kg (257 lb)   SpO2 95%     Current medications as of today   Current Outpatient Medications   Medication Sig Dispense Refill   • pioglitazone (ACTOS) 30 MG Tab Take 30 mg by mouth every day.     • piroxicam (FELDENE) 10 MG Cap Take 10 mg by mouth every day.     • losartan-hydrochlorothiazide (HYZAAR) 50-12.5 MG per tablet Take 1 Tab by mouth every day.       No current facility-administered medications for this visit.         Physical Exam:   Gen:           Alert and oriented, No apparent distress. Mood and affect appropriate, normal interaction with examiner.  Eyes:          PERRL, EOM intact, sclere white, conjunctive moist. Glasses.  Ears:          Not examined.   Hearing:     Grossly intact.  Nose:          Normal, no lesions or deformities.  Dentition:    mask  Oropharynx:   mask  Mallampati Classification: mask  Neck:        Supple, trachea midline, no masses.  Respiratory Effort: No intercostal retractions or use of accessory muscles.   Lung Auscultation:      Clear to auscultation bilaterally; no " rales, rhonchi or wheezing.  CV:            Regular rate and rhythm. No murmurs, rubs or gallops.  Abd:           Not examined.   Lymphadenopathy: Not examined.  Gait and Station: Normal.  Digits and Nails: No clubbing, cyanosis, petechiae, or nodes.   Cranial Nerves: II-XII grossly intact.  Skin:        No rashes, lesions or ulcers noted.               Ext:           No cyanosis or edema.      Assessment:  1. ROXANE (obstructive sleep apnea)     2. BMI 33.0-33.9,adult  Height And Weight   3. Essential hypertension     4. Current smoker         Immunizations:    Flu:10/1/21  Pneumovax 23:recommend  Prevnar 13:recommend  COVID-19: 10/1/21, 1/7/21, 12/17/20    Plan:  1. ROXANE is well controlled but he would like RAMP start pressure increased. Continue CPAP nightly.  DME other; adjust to RAMP start 8cm  DME mask/supplies  2. Discussed sleep hygiene  3. F/u with PCP for ongoing management of HTN  4. Strongly encouraged smoking cessation  5. F/u in 1 year with compliance report, sooner if needed.    Please note that this dictation was created using voice recognition software. I have made every reasonable attempt to correct obvious errors, but it is possible there are errors of grammar and possibly content that I did not discover before finalizing the note.

## 2022-06-24 ENCOUNTER — HOSPITAL ENCOUNTER (OUTPATIENT)
Dept: LAB | Facility: MEDICAL CENTER | Age: 72
End: 2022-06-24
Attending: FAMILY MEDICINE
Payer: COMMERCIAL

## 2022-06-24 LAB
ALBUMIN SERPL BCP-MCNC: 4.3 G/DL (ref 3.2–4.9)
ALBUMIN/GLOB SERPL: 1.5 G/DL
ALP SERPL-CCNC: 87 U/L (ref 30–99)
ALT SERPL-CCNC: 15 U/L (ref 2–50)
ANION GAP SERPL CALC-SCNC: 15 MMOL/L (ref 7–16)
AST SERPL-CCNC: 15 U/L (ref 12–45)
BILIRUB SERPL-MCNC: 0.5 MG/DL (ref 0.1–1.5)
BUN SERPL-MCNC: 17 MG/DL (ref 8–22)
CALCIUM SERPL-MCNC: 9 MG/DL (ref 8.5–10.5)
CHLORIDE SERPL-SCNC: 98 MMOL/L (ref 96–112)
CHOLEST SERPL-MCNC: 166 MG/DL (ref 100–199)
CO2 SERPL-SCNC: 23 MMOL/L (ref 20–33)
CREAT SERPL-MCNC: 0.96 MG/DL (ref 0.5–1.4)
EST. AVERAGE GLUCOSE BLD GHB EST-MCNC: 214 MG/DL
FASTING STATUS PATIENT QL REPORTED: NORMAL
GFR SERPLBLD CREATININE-BSD FMLA CKD-EPI: 85 ML/MIN/1.73 M 2
GLOBULIN SER CALC-MCNC: 2.9 G/DL (ref 1.9–3.5)
GLUCOSE SERPL-MCNC: 89 MG/DL (ref 65–99)
HBA1C MFR BLD: 9.1 % (ref 4–5.6)
HDLC SERPL-MCNC: 49 MG/DL
LDLC SERPL CALC-MCNC: 98 MG/DL
POTASSIUM SERPL-SCNC: 3.7 MMOL/L (ref 3.6–5.5)
PROT SERPL-MCNC: 7.2 G/DL (ref 6–8.2)
SODIUM SERPL-SCNC: 136 MMOL/L (ref 135–145)
TRIGL SERPL-MCNC: 95 MG/DL (ref 0–149)

## 2022-06-24 PROCEDURE — 80061 LIPID PANEL: CPT

## 2022-06-24 PROCEDURE — 80053 COMPREHEN METABOLIC PANEL: CPT

## 2022-06-24 PROCEDURE — 83036 HEMOGLOBIN GLYCOSYLATED A1C: CPT

## 2022-06-24 PROCEDURE — 36415 COLL VENOUS BLD VENIPUNCTURE: CPT

## 2022-10-31 ENCOUNTER — HOSPITAL ENCOUNTER (OUTPATIENT)
Dept: LAB | Facility: MEDICAL CENTER | Age: 72
End: 2022-10-31
Attending: FAMILY MEDICINE
Payer: COMMERCIAL

## 2022-10-31 LAB
ALBUMIN SERPL BCP-MCNC: 4.3 G/DL (ref 3.2–4.9)
ALBUMIN/GLOB SERPL: 1.3 G/DL
ALP SERPL-CCNC: 90 U/L (ref 30–99)
ALT SERPL-CCNC: 19 U/L (ref 2–50)
ANION GAP SERPL CALC-SCNC: 11 MMOL/L (ref 7–16)
AST SERPL-CCNC: 16 U/L (ref 12–45)
BASOPHILS # BLD AUTO: 0.9 % (ref 0–1.8)
BASOPHILS # BLD: 0.06 K/UL (ref 0–0.12)
BILIRUB SERPL-MCNC: 0.5 MG/DL (ref 0.1–1.5)
BUN SERPL-MCNC: 16 MG/DL (ref 8–22)
CALCIUM SERPL-MCNC: 9.3 MG/DL (ref 8.5–10.5)
CHLORIDE SERPL-SCNC: 104 MMOL/L (ref 96–112)
CHOLEST SERPL-MCNC: 175 MG/DL (ref 100–199)
CO2 SERPL-SCNC: 27 MMOL/L (ref 20–33)
CREAT SERPL-MCNC: 0.79 MG/DL (ref 0.5–1.4)
EOSINOPHIL # BLD AUTO: 0.09 K/UL (ref 0–0.51)
EOSINOPHIL NFR BLD: 1.4 % (ref 0–6.9)
ERYTHROCYTE [DISTWIDTH] IN BLOOD BY AUTOMATED COUNT: 49 FL (ref 35.9–50)
EST. AVERAGE GLUCOSE BLD GHB EST-MCNC: 177 MG/DL
FASTING STATUS PATIENT QL REPORTED: NORMAL
GFR SERPLBLD CREATININE-BSD FMLA CKD-EPI: 95 ML/MIN/1.73 M 2
GLOBULIN SER CALC-MCNC: 3.2 G/DL (ref 1.9–3.5)
GLUCOSE SERPL-MCNC: 86 MG/DL (ref 65–99)
HBA1C MFR BLD: 7.8 % (ref 4–5.6)
HCT VFR BLD AUTO: 48.6 % (ref 42–52)
HDLC SERPL-MCNC: 51 MG/DL
HGB BLD-MCNC: 15.7 G/DL (ref 14–18)
IMM GRANULOCYTES # BLD AUTO: 0.03 K/UL (ref 0–0.11)
IMM GRANULOCYTES NFR BLD AUTO: 0.5 % (ref 0–0.9)
LDLC SERPL CALC-MCNC: 107 MG/DL
LYMPHOCYTES # BLD AUTO: 1.81 K/UL (ref 1–4.8)
LYMPHOCYTES NFR BLD: 27.3 % (ref 22–41)
MCH RBC QN AUTO: 29.9 PG (ref 27–33)
MCHC RBC AUTO-ENTMCNC: 32.3 G/DL (ref 33.7–35.3)
MCV RBC AUTO: 92.6 FL (ref 81.4–97.8)
MONOCYTES # BLD AUTO: 0.54 K/UL (ref 0–0.85)
MONOCYTES NFR BLD AUTO: 8.1 % (ref 0–13.4)
NEUTROPHILS # BLD AUTO: 4.1 K/UL (ref 1.82–7.42)
NEUTROPHILS NFR BLD: 61.8 % (ref 44–72)
NRBC # BLD AUTO: 0 K/UL
NRBC BLD-RTO: 0 /100 WBC
PLATELET # BLD AUTO: 247 K/UL (ref 164–446)
PMV BLD AUTO: 10.5 FL (ref 9–12.9)
POTASSIUM SERPL-SCNC: 3.7 MMOL/L (ref 3.6–5.5)
PROT SERPL-MCNC: 7.5 G/DL (ref 6–8.2)
RBC # BLD AUTO: 5.25 M/UL (ref 4.7–6.1)
SODIUM SERPL-SCNC: 142 MMOL/L (ref 135–145)
TRIGL SERPL-MCNC: 85 MG/DL (ref 0–149)
WBC # BLD AUTO: 6.6 K/UL (ref 4.8–10.8)

## 2022-10-31 PROCEDURE — 80053 COMPREHEN METABOLIC PANEL: CPT

## 2022-10-31 PROCEDURE — 83036 HEMOGLOBIN GLYCOSYLATED A1C: CPT

## 2022-10-31 PROCEDURE — 85025 COMPLETE CBC W/AUTO DIFF WBC: CPT

## 2022-10-31 PROCEDURE — 80061 LIPID PANEL: CPT

## 2022-10-31 PROCEDURE — 36415 COLL VENOUS BLD VENIPUNCTURE: CPT

## 2023-03-08 ENCOUNTER — HOSPITAL ENCOUNTER (OUTPATIENT)
Dept: LAB | Facility: MEDICAL CENTER | Age: 73
End: 2023-03-08
Attending: FAMILY MEDICINE
Payer: COMMERCIAL

## 2023-03-08 LAB
ALBUMIN SERPL BCP-MCNC: 4.3 G/DL (ref 3.2–4.9)
ALBUMIN/GLOB SERPL: 1.4 G/DL
ALP SERPL-CCNC: 92 U/L (ref 30–99)
ALT SERPL-CCNC: 20 U/L (ref 2–50)
ANION GAP SERPL CALC-SCNC: 11 MMOL/L (ref 7–16)
AST SERPL-CCNC: 19 U/L (ref 12–45)
BILIRUB SERPL-MCNC: 0.6 MG/DL (ref 0.1–1.5)
BUN SERPL-MCNC: 14 MG/DL (ref 8–22)
CALCIUM ALBUM COR SERPL-MCNC: 8.8 MG/DL (ref 8.5–10.5)
CALCIUM SERPL-MCNC: 9 MG/DL (ref 8.5–10.5)
CHLORIDE SERPL-SCNC: 100 MMOL/L (ref 96–112)
CHOLEST SERPL-MCNC: 165 MG/DL (ref 100–199)
CO2 SERPL-SCNC: 26 MMOL/L (ref 20–33)
CREAT SERPL-MCNC: 0.83 MG/DL (ref 0.5–1.4)
EST. AVERAGE GLUCOSE BLD GHB EST-MCNC: 194 MG/DL
FASTING STATUS PATIENT QL REPORTED: NORMAL
GFR SERPLBLD CREATININE-BSD FMLA CKD-EPI: 94 ML/MIN/1.73 M 2
GLOBULIN SER CALC-MCNC: 3 G/DL (ref 1.9–3.5)
GLUCOSE SERPL-MCNC: 88 MG/DL (ref 65–99)
HBA1C MFR BLD: 8.4 % (ref 4–5.6)
HDLC SERPL-MCNC: 51 MG/DL
LDLC SERPL CALC-MCNC: 96 MG/DL
POTASSIUM SERPL-SCNC: 3.8 MMOL/L (ref 3.6–5.5)
PROT SERPL-MCNC: 7.3 G/DL (ref 6–8.2)
PSA SERPL-MCNC: 0.07 NG/ML (ref 0–4)
SODIUM SERPL-SCNC: 137 MMOL/L (ref 135–145)
TRIGL SERPL-MCNC: 91 MG/DL (ref 0–149)

## 2023-03-08 PROCEDURE — 83036 HEMOGLOBIN GLYCOSYLATED A1C: CPT

## 2023-03-08 PROCEDURE — 84153 ASSAY OF PSA TOTAL: CPT

## 2023-03-08 PROCEDURE — 80053 COMPREHEN METABOLIC PANEL: CPT

## 2023-03-08 PROCEDURE — 36415 COLL VENOUS BLD VENIPUNCTURE: CPT

## 2023-03-08 PROCEDURE — 80061 LIPID PANEL: CPT

## 2023-04-18 PROBLEM — Z92.29 UP TO DATE WITH INFLUENZA VACCINATION: Status: RESOLVED | Noted: 2019-02-24 | Resolved: 2023-04-18

## 2023-04-19 ENCOUNTER — OFFICE VISIT (OUTPATIENT)
Dept: MEDICAL GROUP | Facility: PHYSICIAN GROUP | Age: 73
End: 2023-04-19
Payer: COMMERCIAL

## 2023-04-19 VITALS
HEIGHT: 73 IN | DIASTOLIC BLOOD PRESSURE: 70 MMHG | BODY MASS INDEX: 34.02 KG/M2 | OXYGEN SATURATION: 97 % | WEIGHT: 256.7 LBS | HEART RATE: 83 BPM | SYSTOLIC BLOOD PRESSURE: 128 MMHG | TEMPERATURE: 97.8 F

## 2023-04-19 DIAGNOSIS — Z11.59 NEED FOR HEPATITIS C SCREENING TEST: ICD-10-CM

## 2023-04-19 DIAGNOSIS — G47.33 OSA (OBSTRUCTIVE SLEEP APNEA): ICD-10-CM

## 2023-04-19 DIAGNOSIS — E11.65 TYPE 2 DIABETES MELLITUS WITH HYPERGLYCEMIA, WITH LONG-TERM CURRENT USE OF INSULIN (HCC): ICD-10-CM

## 2023-04-19 DIAGNOSIS — E66.9 OBESITY (BMI 30-39.9): ICD-10-CM

## 2023-04-19 DIAGNOSIS — Z86.010 HISTORY OF COLON POLYPS: ICD-10-CM

## 2023-04-19 DIAGNOSIS — Z79.4 TYPE 2 DIABETES MELLITUS WITH HYPERGLYCEMIA, WITH LONG-TERM CURRENT USE OF INSULIN (HCC): ICD-10-CM

## 2023-04-19 DIAGNOSIS — I10 ESSENTIAL HYPERTENSION: ICD-10-CM

## 2023-04-19 DIAGNOSIS — F17.200 SMOKER: ICD-10-CM

## 2023-04-19 DIAGNOSIS — Z23 NEED FOR VACCINATION: ICD-10-CM

## 2023-04-19 PROCEDURE — 90471 IMMUNIZATION ADMIN: CPT | Performed by: FAMILY MEDICINE

## 2023-04-19 PROCEDURE — 99204 OFFICE O/P NEW MOD 45 MIN: CPT | Mod: 25 | Performed by: FAMILY MEDICINE

## 2023-04-19 PROCEDURE — 90677 PCV20 VACCINE IM: CPT | Performed by: FAMILY MEDICINE

## 2023-04-19 RX ORDER — LANCETS 30 GAUGE
EACH MISCELLANEOUS
Qty: 100 EACH | Refills: 0 | Status: SHIPPED | OUTPATIENT
Start: 2023-04-19 | End: 2023-05-01

## 2023-04-19 RX ORDER — PIOGLITAZONEHYDROCHLORIDE 45 MG/1
TABLET ORAL
COMMUNITY
Start: 2023-04-12 | End: 2023-05-01 | Stop reason: SDUPTHER

## 2023-04-19 RX ORDER — TELMISARTAN AND HYDROCHLORTHIAZIDE 80; 25 MG/1; MG/1
TABLET ORAL
COMMUNITY
Start: 2023-04-12 | End: 2023-05-01 | Stop reason: SDUPTHER

## 2023-04-19 ASSESSMENT — FIBROSIS 4 INDEX: FIB4 SCORE: 1.2

## 2023-04-19 ASSESSMENT — PATIENT HEALTH QUESTIONNAIRE - PHQ9: CLINICAL INTERPRETATION OF PHQ2 SCORE: 0

## 2023-04-19 NOTE — PROGRESS NOTES
"CHIEF COMPLAINT / REASON FOR VISIT  Darvin Jackson is a 72 y.o. male that presents today to establish care.    HISTORY OF PRESENT ILLNESS  Doesn't have any particular concerns today.    Denies vision changes, lower extremity numbness.    Recently had retinal exam.    Exercise: stretches in morning. Walks a couple miles per day through work ()  Diet: eats a lot of [place] food and veggies, avoids sugars    Past Medical History  Hypertension - telmisartan-hydrochlorothiazide 80-25 mg daily  Obesity  Obstructive Sleep apnea - on CPAP  Cigarette smoking - 1 cigar per week for 10 years  DM2 - Soliqua (glargine-lixisenatide) 100 units-33 mcg/mL taking 26 units QHS, Synjardy (Empagliflozin-metformin) 12.5-1000 mg BID, pioglitazone 45 mg daily  ED - as needed sildenafil    Past Surgical History:   Procedure Laterality Date    PROSTATECTOMY, RADICAL RETRO       Social History     Tobacco Use    Smoking status: Some Days     Types: Cigars    Smokeless tobacco: Never   Vaping Use    Vaping Use: Never used   Substance Use Topics    Alcohol use: Yes     Alcohol/week: 1.2 - 3.6 oz     Types: 2 - 6 Cans of beer per week    Drug use: No   1-2 drinks every couple of days    OBJECTIVE    /70 (BP Location: Left arm, Patient Position: Sitting, BP Cuff Size: Adult)   Pulse 83   Temp 36.6 °C (97.8 °F) (Temporal)   Ht 1.854 m (6' 1\")   Wt 116 kg (256 lb 11.2 oz)   SpO2 97%   BMI 33.87 kg/m²  Body mass index is 33.87 kg/m².    PHYSICAL EXAM  Constitutional: Sitting comfortably, in no acute distress, responds to questions appropriately.  Head: Normocephalic  Eyes:  No conjunctival injection, no scleral icterus, PERRL  Ears: External ear canals clear, TMs pearly grey with visualized bony landmarks and crisp light reflex  Mouth: Oral mucosa moist. Good dentition  Throat: Oropharynx clear without erythema or tonsillar exudates  Neck: No cervical lymphadenopathy  Heart: Regular S1 S2, no murmurs, rub, or " gallops  Lungs: Clear to auscultation bilaterally, no wheezes, rales, or rhonchi  Abdomen: Soft, nontender, nondistended  Monofilament testing with a 10 gram force: sensation intact: intact bilaterally  Visual Inspection: Feet without maceration, ulcers, fissures. Left 1st toe callus, no signs of ulceration  Pedal pulses: intact bilaterally  Skin: Warm and dry    ASSESSMENT & PLAN  1. Essential hypertension  Chronic, controlled on telmisartan-hydrochlorothiazide 80-25 mg daily. Continue current therapy    2. Type 2 diabetes mellitus with hyperglycemia, with long-term current use of insulin (HCC)  Chronic, uncontrolled with hemoglobin A1c 8.4.  His last fasting glucose was actually normal at 88.  Recommend that he measure his morning fasting glucose at home, and if consistently over 140 should increase the dose of his Soliqua (glargine-lixisenatide) by 2 units, currently taking 26 units daily. Synjardy (Empagliflozin-metformin) 12.5-1000 mg BID, pioglitazone 45 mg daily.  - HEMOGLOBIN A1C; Future  - Microalbumin Creat Ratio Urine (Lab Collect); Future  - Diabetic Monofilament LE Exam  - Blood Glucose Test Strips; Use one test strip to test blood sugar once daily early morning before first meal.  Dispense: 100 Strip; Refill: 0  - Lancets; Use one lancet to test blood sugar once daily early morning before first meal.  Dispense: 100 Each; Refill: 0    3. ROXANE (obstructive sleep apnea)  Chronic, on CPAP.    4. Obesity (BMI 30-39.9)  Counseled regarding importance of regular exercise and healthy dietary habits for weight loss.     5. Smoker  1 cigar every week for 10 years.    6. History of colon polyps  - Referral to GI for Colonoscopy    7. Need for vaccination  - Pneumococcal Conjugate Vaccine 20-Valent (19 yrs+)    8. Need for hepatitis C screening test  - HEP C VIRUS ANTIBODY; Future    Follow-up 3 months for diabetes follow-up, will repeat A1c prior to that visit

## 2023-05-01 DIAGNOSIS — E11.65 TYPE 2 DIABETES MELLITUS WITH HYPERGLYCEMIA, WITH LONG-TERM CURRENT USE OF INSULIN (HCC): ICD-10-CM

## 2023-05-01 DIAGNOSIS — Z79.4 TYPE 2 DIABETES MELLITUS WITH HYPERGLYCEMIA, WITH LONG-TERM CURRENT USE OF INSULIN (HCC): ICD-10-CM

## 2023-05-01 DIAGNOSIS — I10 ESSENTIAL HYPERTENSION: ICD-10-CM

## 2023-05-01 DIAGNOSIS — N52.9 ERECTILE DYSFUNCTION, UNSPECIFIED ERECTILE DYSFUNCTION TYPE: ICD-10-CM

## 2023-05-01 RX ORDER — PIOGLITAZONEHYDROCHLORIDE 45 MG/1
45 TABLET ORAL DAILY
Qty: 90 TABLET | Refills: 3 | Status: SHIPPED | OUTPATIENT
Start: 2023-05-01 | End: 2023-07-26 | Stop reason: SDUPTHER

## 2023-05-01 RX ORDER — SILDENAFIL 100 MG/1
100 TABLET, FILM COATED ORAL PRN
Qty: 10 TABLET | Refills: 3 | Status: SHIPPED | OUTPATIENT
Start: 2023-05-01 | End: 2023-05-31 | Stop reason: SDUPTHER

## 2023-05-01 RX ORDER — TELMISARTAN AND HYDROCHLORTHIAZIDE 80; 25 MG/1; MG/1
1 TABLET ORAL DAILY
Qty: 90 TABLET | Refills: 3 | Status: SHIPPED | OUTPATIENT
Start: 2023-05-01 | End: 2023-07-26 | Stop reason: SDUPTHER

## 2023-05-01 RX ORDER — EMPAGLIFLOZIN AND METFORMIN HYDROCHLORIDE 12.5; 1 MG/1; MG/1
1 TABLET ORAL 2 TIMES DAILY
Qty: 180 TABLET | Refills: 3 | Status: SHIPPED | OUTPATIENT
Start: 2023-05-01 | End: 2023-07-26 | Stop reason: SDUPTHER

## 2023-05-01 RX ORDER — INSULIN GLARGINE AND LIXISENATIDE 100; 33 U/ML; UG/ML
26 INJECTION, SOLUTION SUBCUTANEOUS
Qty: 3 ML | Refills: 3 | Status: SHIPPED | OUTPATIENT
Start: 2023-05-01 | End: 2024-01-22 | Stop reason: SDUPTHER

## 2023-05-01 RX ORDER — LANCETS 30 GAUGE
EACH MISCELLANEOUS
Qty: 100 EACH | Refills: 0 | Status: SHIPPED | OUTPATIENT
Start: 2023-05-01

## 2023-05-01 RX ORDER — GLUCOSAMINE HCL/CHONDROITIN SU 500-400 MG
CAPSULE ORAL
Qty: 100 EACH | Refills: 0 | Status: SHIPPED | OUTPATIENT
Start: 2023-05-01

## 2023-05-21 DIAGNOSIS — N52.9 ERECTILE DYSFUNCTION, UNSPECIFIED ERECTILE DYSFUNCTION TYPE: ICD-10-CM

## 2023-05-21 RX ORDER — SILDENAFIL 100 MG/1
100 TABLET, FILM COATED ORAL PRN
Qty: 10 TABLET | Refills: 3 | Status: CANCELLED | OUTPATIENT
Start: 2023-05-21

## 2023-05-22 NOTE — PROGRESS NOTES
Requested Prescriptions     Pending Prescriptions Disp Refills    felodipine ER (PLENDIL) 10 MG TABLET SR 24 HR 30 Tablet 0     Sig: Take 1 Tablet by mouth every day.    Patient states recently had all medication transferred over from [facility], and this particular medication was denied, patient is request a new Rx be sent to preferred pharmacy

## 2023-05-22 NOTE — TELEPHONE ENCOUNTER
Received request via: Patient    Was the patient seen in the last year in this department? Yes    Does the patient have an active prescription (recently filled or refills available) for medication(s) requested?  Medication requested sent in 05/01/2023    Does the patient have alf Plus and need 100 day supply (blood pressure, diabetes and cholesterol meds only)? Patient does not have SCP

## 2023-05-25 ENCOUNTER — TELEMEDICINE (OUTPATIENT)
Dept: SLEEP MEDICINE | Facility: MEDICAL CENTER | Age: 73
End: 2023-05-25
Attending: NURSE PRACTITIONER
Payer: COMMERCIAL

## 2023-05-25 VITALS — BODY MASS INDEX: 33.4 KG/M2 | WEIGHT: 252 LBS | HEIGHT: 73 IN

## 2023-05-25 DIAGNOSIS — G47.33 OSA (OBSTRUCTIVE SLEEP APNEA): ICD-10-CM

## 2023-05-25 DIAGNOSIS — F17.200 CURRENT SMOKER: ICD-10-CM

## 2023-05-25 DIAGNOSIS — I10 ESSENTIAL HYPERTENSION: ICD-10-CM

## 2023-05-25 DIAGNOSIS — E66.9 OBESITY (BMI 30-39.9): ICD-10-CM

## 2023-05-25 PROCEDURE — 99214 OFFICE O/P EST MOD 30 MIN: CPT | Mod: 95 | Performed by: NURSE PRACTITIONER

## 2023-05-25 RX ORDER — FELODIPINE 10 MG/1
10 TABLET, EXTENDED RELEASE ORAL DAILY
COMMUNITY
End: 2023-05-25

## 2023-05-25 RX ORDER — FELODIPINE 10 MG/1
10 TABLET, EXTENDED RELEASE ORAL DAILY
Qty: 90 TABLET | Refills: 3 | Status: SHIPPED | OUTPATIENT
Start: 2023-05-25 | End: 2023-07-26 | Stop reason: SDUPTHER

## 2023-05-25 ASSESSMENT — FIBROSIS 4 INDEX: FIB4 SCORE: 1.22

## 2023-05-25 NOTE — PROGRESS NOTES
Virtual Visit: Established Patient   This visit was conducted via Zoom using secure and encrypted videoconferencing technology.   The patient was in their home in the [facility].    The patient's identity was confirmed and verbal consent was obtained for this virtual visit.     Subjective:   CC: No chief complaint on file.      Darvin Jackson is a 73 y.o. male presenting for evaluation and management of:    ROXANE  Last OV 5/4/22 via telemedicine    Currently using APAP @ 5-20cm H20 nightly; [employer] dreamstation 2 obtained from recall 2022; original device obtained 2018 through insurance.  Compliance report 4/25/23-5/24/23 indicates 100% compliance, avg nightly use of 6hr 4min, mean pressure 12.9cm, no significant mask leak with overall AHI 11.7/hr. He generally has AHI 9-10/hr since starting therapy. Weight is done 5lbs from last year. He continues to tolerate mask and pressure well but feels the start up pressure is low. He goes to bed at 10pm, falls asleep quickly, wakes 1x in early morning to urinate and returns to sleep then wakes by 5am. He denies AM headaches but can feel groggy. He denies daytime sleepiness or napping. He does continue to smoke 1 cigar per week. He denies any cardiac or respiratory symptoms today.    SLEEP HX:  HST 2018:  Prior diagnosis in 1980's and previous Dr. Beasley patient.   1.Sever obstructive sleep apnea with  Respiratory Event Index (GRICEL) of 82.0 per hour. These findings are based on the recording time (flow evaluation time). It is not possible with this device to determine a traditional apnea+hypopnea index (AHI) for total sleep time since EEG channels are not available.   2.  O2 Sat. hiram was 69% and baseline O2 at 98 %. O2 sat was below 89% for 123 min of the flow evaluation time. Avg HR 60 BPM.       ROS   In HPI; otherwise negative    Current medicines (including changes today)  Current Outpatient Medications   Medication Sig Dispense Refill    felodipine ER  "(PLENDIL) 10 MG TABLET SR 24 HR Take 10 mg by mouth every day.      Blood Glucose Meter Kit Test blood sugar once every morning 1 Kit 0    Blood Glucose Test Strips Use one strip to test blood sugar once daily early morning before first meal. 100 Strip 0    Lancets Use one lancet to test blood sugar once daily early morning before first meal. 100 Each 0    Alcohol Swabs Wipe site with prep pad prior to injection. 100 Each 0    pioglitazone (ACTOS) 45 MG Tab Take 1 Tablet by mouth every day. 90 Tablet 3    sildenafil citrate (VIAGRA) 100 MG tablet Take 1 Tablet by mouth as needed for Erectile Dysfunction. 10 Tablet 3    SOLIQUA 100-33 UNT-MCG/ML Solution Pen-injector Inject 26 Units under the skin at bedtime. 3 mL 3    SYNJARDY 12.5-1000 MG Tab Take 1 Tablet by mouth 2 times a day. 180 Tablet 3    telmisartan-hydrochlorothiazide (MICARDIS HCT) 80-25 MG per tablet Take 1 Tablet by mouth every day. 90 Tablet 3     No current facility-administered medications for this visit.       Patient Active Problem List    Diagnosis Date Noted    Type 2 diabetes mellitus with hyperglycemia, with long-term current use of insulin ([facility]) 04/19/2023    Essential hypertension 08/21/2019    ROXANE (obstructive sleep apnea) 02/24/2019    Smoker 02/24/2019    Obesity (BMI 30-39.9) 10/24/2018        Objective:   Ht 1.854 m (6' 1\")   Wt 114 kg (252 lb)   BMI 33.25 kg/m²     Physical Exam:  Constitutional: Alert, no distress, well-groomed.  Skin: No rashes in visible areas.  Eye: Round. Conjunctiva clear, lids normal. No icterus. Glasses.  ENMT: Lips pink without lesions, good dentition, moist mucous membranes. Phonation normal.  Neck: No masses, no thyromegaly. Moves freely without pain.  Respiratory: Unlabored respiratory effort, no cough or audible wheeze  Psych: Alert and oriented x3, normal affect and mood.     Assessment and Plan:   The following treatment plan was discussed:     1. ROXANE (obstructive sleep apnea)  - DME Mask and " Supplies    2. Obesity (BMI 30-39.9)  - HEIGHT AND WEIGHT    3. Essential hypertension    4. Current smoker    Other orders  - felodipine ER (PLENDIL) 10 MG TABLET SR 24 HR; Take 10 mg by mouth every day.    ROXANE is well controlled; continue APAP 8-20cm; RAMP to be turned off and patient will message me if this is uncomfortable.   DME mask/supplies  Discussed sleep hygiene  Encourage weight loss through healthy eating and exercise  F/u with primary care for other health concerns  Consider overnight oximetry if waking with headaches/increased daytime fatigue    Follow-up: Return in about 1 year (around 5/25/2024) for Mónica PEMBERTON, AT SLEEP, SHORT follow up, Sooner if needed., With Compliance Card.

## 2023-05-31 ENCOUNTER — PATIENT MESSAGE (OUTPATIENT)
Dept: SLEEP MEDICINE | Facility: MEDICAL CENTER | Age: 73
End: 2023-05-31
Payer: COMMERCIAL

## 2023-05-31 DIAGNOSIS — N52.9 ERECTILE DYSFUNCTION, UNSPECIFIED ERECTILE DYSFUNCTION TYPE: ICD-10-CM

## 2023-06-07 RX ORDER — SILDENAFIL 100 MG/1
100 TABLET, FILM COATED ORAL PRN
Qty: 10 TABLET | Refills: 3 | Status: SHIPPED | OUTPATIENT
Start: 2023-06-07 | End: 2023-07-26 | Stop reason: SDUPTHER

## 2023-06-15 ENCOUNTER — TELEPHONE (OUTPATIENT)
Dept: MEDICAL GROUP | Facility: PHYSICIAN GROUP | Age: 73
End: 2023-06-15
Payer: COMMERCIAL

## 2023-06-15 DIAGNOSIS — Z79.4 TYPE 2 DIABETES MELLITUS WITH HYPERGLYCEMIA, WITH LONG-TERM CURRENT USE OF INSULIN (HCC): ICD-10-CM

## 2023-06-15 DIAGNOSIS — E11.65 TYPE 2 DIABETES MELLITUS WITH HYPERGLYCEMIA, WITH LONG-TERM CURRENT USE OF INSULIN (HCC): ICD-10-CM

## 2023-06-15 RX ORDER — BLOOD SUGAR DIAGNOSTIC
STRIP MISCELLANEOUS
Qty: 100 EACH | Refills: 3 | Status: SHIPPED | OUTPATIENT
Start: 2023-06-15 | End: 2023-07-26 | Stop reason: SDUPTHER

## 2023-06-15 NOTE — TELEPHONE ENCOUNTER
Patient called to request a refill of pen needles 5/32 x 23 mm to be sent into the [facility] [address]

## 2023-07-17 ENCOUNTER — HOSPITAL ENCOUNTER (OUTPATIENT)
Dept: LAB | Facility: MEDICAL CENTER | Age: 73
End: 2023-07-17
Attending: FAMILY MEDICINE
Payer: COMMERCIAL

## 2023-07-17 DIAGNOSIS — Z11.59 NEED FOR HEPATITIS C SCREENING TEST: ICD-10-CM

## 2023-07-17 DIAGNOSIS — Z79.4 TYPE 2 DIABETES MELLITUS WITH HYPERGLYCEMIA, WITH LONG-TERM CURRENT USE OF INSULIN (HCC): ICD-10-CM

## 2023-07-17 DIAGNOSIS — E11.65 TYPE 2 DIABETES MELLITUS WITH HYPERGLYCEMIA, WITH LONG-TERM CURRENT USE OF INSULIN (HCC): ICD-10-CM

## 2023-07-17 LAB
CREAT UR-MCNC: 43.6 MG/DL
EST. AVERAGE GLUCOSE BLD GHB EST-MCNC: 209 MG/DL
HBA1C MFR BLD: 8.9 % (ref 4–5.6)
HCV AB SER QL: NORMAL
MICROALBUMIN UR-MCNC: <1.2 MG/DL
MICROALBUMIN/CREAT UR: NORMAL MG/G (ref 0–30)

## 2023-07-17 PROCEDURE — 86803 HEPATITIS C AB TEST: CPT

## 2023-07-17 PROCEDURE — 83036 HEMOGLOBIN GLYCOSYLATED A1C: CPT

## 2023-07-17 PROCEDURE — 36415 COLL VENOUS BLD VENIPUNCTURE: CPT

## 2023-07-17 PROCEDURE — 82043 UR ALBUMIN QUANTITATIVE: CPT

## 2023-07-17 PROCEDURE — 82570 ASSAY OF URINE CREATININE: CPT

## 2023-07-19 ENCOUNTER — OFFICE VISIT (OUTPATIENT)
Dept: MEDICAL GROUP | Facility: PHYSICIAN GROUP | Age: 73
End: 2023-07-19
Payer: COMMERCIAL

## 2023-07-19 VITALS
BODY MASS INDEX: 33.53 KG/M2 | RESPIRATION RATE: 14 BRPM | WEIGHT: 253 LBS | DIASTOLIC BLOOD PRESSURE: 78 MMHG | HEART RATE: 78 BPM | TEMPERATURE: 97.8 F | HEIGHT: 73 IN | OXYGEN SATURATION: 97 % | SYSTOLIC BLOOD PRESSURE: 124 MMHG

## 2023-07-19 DIAGNOSIS — F17.200 SMOKER: ICD-10-CM

## 2023-07-19 DIAGNOSIS — I10 ESSENTIAL HYPERTENSION: ICD-10-CM

## 2023-07-19 DIAGNOSIS — Z79.4 TYPE 2 DIABETES MELLITUS WITH HYPERGLYCEMIA, WITH LONG-TERM CURRENT USE OF INSULIN (HCC): ICD-10-CM

## 2023-07-19 DIAGNOSIS — E11.65 TYPE 2 DIABETES MELLITUS WITH HYPERGLYCEMIA, WITH LONG-TERM CURRENT USE OF INSULIN (HCC): ICD-10-CM

## 2023-07-19 DIAGNOSIS — Z13.6 ENCOUNTER FOR ABDOMINAL AORTIC ANEURYSM (AAA) SCREENING: ICD-10-CM

## 2023-07-19 PROCEDURE — 3078F DIAST BP <80 MM HG: CPT | Performed by: FAMILY MEDICINE

## 2023-07-19 PROCEDURE — 99214 OFFICE O/P EST MOD 30 MIN: CPT | Performed by: FAMILY MEDICINE

## 2023-07-19 PROCEDURE — 3074F SYST BP LT 130 MM HG: CPT | Performed by: FAMILY MEDICINE

## 2023-07-19 ASSESSMENT — FIBROSIS 4 INDEX: FIB4 SCORE: 1.22

## 2023-07-19 NOTE — PROGRESS NOTES
"CHIEF COMPLAINT / REASON FOR VISIT  Darvin Jackson is a 73 y.o. male that presents today for diabetes follow-up    HISTORY OF PRESENT ILLNESS  Reports that he feels well, no concerns or questions.  He walks daily through his work.    OBJECTIVE     /78 (BP Location: Left arm, Patient Position: Sitting, BP Cuff Size: Adult)   Pulse 78   Temp 36.6 °C (97.8 °F) (Temporal)   Resp 14   Ht 1.854 m (6' 1\")   Wt 115 kg (253 lb)   SpO2 97%   BMI 33.38 kg/m²      PHYSICAL EXAM  Constitutional: Sitting comfortably, in no acute distress, responds to questions appropriately.  Skin: Warm and dry, no rashes or lesions on face or exposed upper extremities    ASSESSMENT & PLAN  1. Type 2 diabetes mellitus with hyperglycemia, with long-term current use of insulin (HCC)  Uncontrolled with hemoglobin A1c 8.9.  Recommend that he measure his morning fasting glucose at home, if over 140 should increase the dose of his Soliqua (glargine-lixisenatide) by 2 units, currently taking 28 units daily, max dose 60 units.  Continue Synjardy (Empagliflozin-metformin) 12.5-1000 mg BID, pioglitazone 45 mg daily.  Follow-up in 3 months with repeat A1c  - HEMOGLOBIN A1C; Future    2. Smoker  3. Encounter for abdominal aortic aneurysm (AAA) screening  Chronic tobacco smoker, candidate for AAA screening  - US-ABDOMINAL AORTA W/O DOPPLER; Future    4. Essential hypertension  Well-controlled on telmisartan-hydrochlorothiazide 80-25 mg daily  - CBC WITH DIFFERENTIAL; Future      "

## 2023-10-23 ENCOUNTER — HOSPITAL ENCOUNTER (OUTPATIENT)
Dept: LAB | Facility: MEDICAL CENTER | Age: 73
End: 2023-10-23
Attending: FAMILY MEDICINE
Payer: COMMERCIAL

## 2023-10-23 DIAGNOSIS — Z79.4 TYPE 2 DIABETES MELLITUS WITH HYPERGLYCEMIA, WITH LONG-TERM CURRENT USE OF INSULIN (HCC): ICD-10-CM

## 2023-10-23 DIAGNOSIS — I10 ESSENTIAL HYPERTENSION: ICD-10-CM

## 2023-10-23 DIAGNOSIS — E11.65 TYPE 2 DIABETES MELLITUS WITH HYPERGLYCEMIA, WITH LONG-TERM CURRENT USE OF INSULIN (HCC): ICD-10-CM

## 2023-10-23 LAB
BASOPHILS # BLD AUTO: 0.8 % (ref 0–1.8)
BASOPHILS # BLD: 0.06 K/UL (ref 0–0.12)
EOSINOPHIL # BLD AUTO: 0.15 K/UL (ref 0–0.51)
EOSINOPHIL NFR BLD: 1.9 % (ref 0–6.9)
ERYTHROCYTE [DISTWIDTH] IN BLOOD BY AUTOMATED COUNT: 48.1 FL (ref 35.9–50)
EST. AVERAGE GLUCOSE BLD GHB EST-MCNC: 200 MG/DL
HBA1C MFR BLD: 8.6 % (ref 4–5.6)
HCT VFR BLD AUTO: 48.5 % (ref 42–52)
HGB BLD-MCNC: 15.9 G/DL (ref 14–18)
IMM GRANULOCYTES # BLD AUTO: 0.03 K/UL (ref 0–0.11)
IMM GRANULOCYTES NFR BLD AUTO: 0.4 % (ref 0–0.9)
LYMPHOCYTES # BLD AUTO: 2.17 K/UL (ref 1–4.8)
LYMPHOCYTES NFR BLD: 28 % (ref 22–41)
MCH RBC QN AUTO: 30 PG (ref 27–33)
MCHC RBC AUTO-ENTMCNC: 32.8 G/DL (ref 32.3–36.5)
MCV RBC AUTO: 91.5 FL (ref 81.4–97.8)
MONOCYTES # BLD AUTO: 0.58 K/UL (ref 0–0.85)
MONOCYTES NFR BLD AUTO: 7.5 % (ref 0–13.4)
NEUTROPHILS # BLD AUTO: 4.77 K/UL (ref 1.82–7.42)
NEUTROPHILS NFR BLD: 61.4 % (ref 44–72)
NRBC # BLD AUTO: 0 K/UL
NRBC BLD-RTO: 0 /100 WBC (ref 0–0.2)
PLATELET # BLD AUTO: 271 K/UL (ref 164–446)
PMV BLD AUTO: 10.4 FL (ref 9–12.9)
RBC # BLD AUTO: 5.3 M/UL (ref 4.7–6.1)
WBC # BLD AUTO: 7.8 K/UL (ref 4.8–10.8)

## 2023-10-23 PROCEDURE — 85025 COMPLETE CBC W/AUTO DIFF WBC: CPT

## 2023-10-23 PROCEDURE — 83036 HEMOGLOBIN GLYCOSYLATED A1C: CPT

## 2023-10-23 PROCEDURE — 36415 COLL VENOUS BLD VENIPUNCTURE: CPT

## 2023-10-25 ENCOUNTER — OFFICE VISIT (OUTPATIENT)
Dept: MEDICAL GROUP | Facility: PHYSICIAN GROUP | Age: 73
End: 2023-10-25
Payer: COMMERCIAL

## 2023-10-25 VITALS
HEART RATE: 83 BPM | HEIGHT: 73 IN | WEIGHT: 253 LBS | DIASTOLIC BLOOD PRESSURE: 70 MMHG | OXYGEN SATURATION: 97 % | RESPIRATION RATE: 14 BRPM | SYSTOLIC BLOOD PRESSURE: 110 MMHG | TEMPERATURE: 97.7 F | BODY MASS INDEX: 33.53 KG/M2

## 2023-10-25 DIAGNOSIS — Z79.4 TYPE 2 DIABETES MELLITUS WITH HYPERGLYCEMIA, WITH LONG-TERM CURRENT USE OF INSULIN (HCC): ICD-10-CM

## 2023-10-25 DIAGNOSIS — E11.65 TYPE 2 DIABETES MELLITUS WITH HYPERGLYCEMIA, WITH LONG-TERM CURRENT USE OF INSULIN (HCC): ICD-10-CM

## 2023-10-25 PROCEDURE — 3074F SYST BP LT 130 MM HG: CPT | Performed by: FAMILY MEDICINE

## 2023-10-25 PROCEDURE — 99214 OFFICE O/P EST MOD 30 MIN: CPT | Performed by: FAMILY MEDICINE

## 2023-10-25 PROCEDURE — 3078F DIAST BP <80 MM HG: CPT | Performed by: FAMILY MEDICINE

## 2023-10-25 ASSESSMENT — FIBROSIS 4 INDEX: FIB4 SCORE: 1.11

## 2023-10-25 NOTE — PROGRESS NOTES
"CHIEF COMPLAINT / REASON FOR VISIT  Darvin Jackson is a 73 y.o. male that presents today for diabetes follow-up visit    HISTORY OF PRESENT ILLNESS  Hasn't been checking fasting glucoses at home  Currently taking Soliqua 30 units daily    OBJECTIVE    /70 (BP Location: Left arm, Patient Position: Sitting, BP Cuff Size: Adult)   Pulse 83   Temp 36.5 °C (97.7 °F) (Temporal)   Resp 14   Ht 1.854 m (6' 1\")   Wt 115 kg (253 lb)   SpO2 97%   BMI 33.38 kg/m²      PHYSICAL EXAM  Constitutional: Sitting comfortably, in no acute distress, responds to questions appropriately.  Skin: Warm and dry, no rashes or lesions on face or exposed upper extremities    ASSESSMENT & PLAN  1. Type 2 diabetes mellitus with hyperglycemia, with long-term current use of insulin (HCC)  Uncontrolled with hemoglobin A1c 8.6.  Goal less than 7. Recommend that he measure his morning fasting glucose at home, if >140 should increase the dose of his Soliqua (glargine-lixisenatide) by 2 units, currently taking 30 units daily, max dose 60 units.  Continue Synjardy (Empagliflozin-metformin) 12.5-1000 mg BID, pioglitazone 45 mg daily.  Follow-up in 3 months with repeat POC A1c in office        "

## 2023-11-10 RX ORDER — FELODIPINE 10 MG/1
10 TABLET, EXTENDED RELEASE ORAL DAILY
Qty: 90 TABLET | Refills: 3 | Status: SHIPPED | OUTPATIENT
Start: 2023-11-10 | End: 2024-01-22 | Stop reason: SDUPTHER

## 2024-01-08 DIAGNOSIS — N52.9 ERECTILE DYSFUNCTION, UNSPECIFIED ERECTILE DYSFUNCTION TYPE: ICD-10-CM

## 2024-01-09 RX ORDER — SILDENAFIL 100 MG/1
100 TABLET, FILM COATED ORAL PRN
Qty: 10 TABLET | Refills: 3 | Status: SHIPPED | OUTPATIENT
Start: 2024-01-09 | End: 2024-02-22 | Stop reason: SDUPTHER

## 2024-01-11 ENCOUNTER — TELEPHONE (OUTPATIENT)
Dept: HEALTH INFORMATION MANAGEMENT | Facility: OTHER | Age: 74
End: 2024-01-11

## 2024-01-22 DIAGNOSIS — I10 ESSENTIAL HYPERTENSION: ICD-10-CM

## 2024-01-22 DIAGNOSIS — Z79.4 TYPE 2 DIABETES MELLITUS WITH HYPERGLYCEMIA, WITH LONG-TERM CURRENT USE OF INSULIN (HCC): ICD-10-CM

## 2024-01-22 DIAGNOSIS — E11.65 TYPE 2 DIABETES MELLITUS WITH HYPERGLYCEMIA, WITH LONG-TERM CURRENT USE OF INSULIN (HCC): ICD-10-CM

## 2024-01-24 RX ORDER — PIOGLITAZONEHYDROCHLORIDE 45 MG/1
45 TABLET ORAL DAILY
Qty: 90 TABLET | Refills: 3 | Status: SHIPPED | OUTPATIENT
Start: 2024-01-24

## 2024-01-24 RX ORDER — FELODIPINE 10 MG/1
10 TABLET, EXTENDED RELEASE ORAL DAILY
Qty: 90 TABLET | Refills: 3 | Status: SHIPPED | OUTPATIENT
Start: 2024-01-24

## 2024-01-24 RX ORDER — INSULIN GLARGINE AND LIXISENATIDE 100; 33 U/ML; UG/ML
26 INJECTION, SOLUTION SUBCUTANEOUS
Qty: 3 ML | Refills: 3 | Status: SHIPPED | OUTPATIENT
Start: 2024-01-24 | End: 2024-02-14 | Stop reason: SDUPTHER

## 2024-01-24 RX ORDER — TELMISARTAN AND HYDROCHLORTHIAZIDE 80; 25 MG/1; MG/1
1 TABLET ORAL DAILY
Qty: 90 TABLET | Refills: 3 | Status: SHIPPED | OUTPATIENT
Start: 2024-01-24

## 2024-01-24 RX ORDER — EMPAGLIFLOZIN AND METFORMIN HYDROCHLORIDE 12.5; 1 MG/1; MG/1
1 TABLET ORAL 2 TIMES DAILY
Qty: 180 TABLET | Refills: 3 | Status: SHIPPED | OUTPATIENT
Start: 2024-01-24

## 2024-01-24 NOTE — TELEPHONE ENCOUNTER
Received request via: Pharmacy    Was the patient seen in the last year in this department? Yes    Does the patient have an active prescription (recently filled or refills available) for medication(s) requested? No    [facility]    Does the patient have retirement Plus and need 100 day supply (blood pressure, diabetes and cholesterol meds only)? Patient does not have SCP

## 2024-01-29 ENCOUNTER — TELEPHONE (OUTPATIENT)
Dept: MEDICAL GROUP | Facility: PHYSICIAN GROUP | Age: 74
End: 2024-01-29
Payer: COMMERCIAL

## 2024-01-29 DIAGNOSIS — E78.5 DYSLIPIDEMIA: ICD-10-CM

## 2024-01-29 DIAGNOSIS — Z79.4 TYPE 2 DIABETES MELLITUS WITH HYPERGLYCEMIA, WITH LONG-TERM CURRENT USE OF INSULIN (HCC): ICD-10-CM

## 2024-01-29 DIAGNOSIS — E11.65 TYPE 2 DIABETES MELLITUS WITH HYPERGLYCEMIA, WITH LONG-TERM CURRENT USE OF INSULIN (HCC): ICD-10-CM

## 2024-01-29 DIAGNOSIS — I10 ESSENTIAL HYPERTENSION: ICD-10-CM

## 2024-01-30 NOTE — TELEPHONE ENCOUNTER
VOICEMAIL  1. Caller Name: NADINE CASON                        Call Back Number: [phone] (home) [phone] (work)      2. Message: Patient is calling to get some labs ordered prior to his appointment on February 14.    3. Patient approves office to leave a detailed voicemail/MyChart message: yes

## 2024-02-06 ENCOUNTER — HOSPITAL ENCOUNTER (OUTPATIENT)
Dept: LAB | Facility: MEDICAL CENTER | Age: 74
End: 2024-02-06
Attending: FAMILY MEDICINE
Payer: COMMERCIAL

## 2024-02-06 DIAGNOSIS — Z79.4 TYPE 2 DIABETES MELLITUS WITH HYPERGLYCEMIA, WITH LONG-TERM CURRENT USE OF INSULIN (HCC): ICD-10-CM

## 2024-02-06 DIAGNOSIS — I10 ESSENTIAL HYPERTENSION: ICD-10-CM

## 2024-02-06 DIAGNOSIS — E78.5 DYSLIPIDEMIA: ICD-10-CM

## 2024-02-06 DIAGNOSIS — E11.65 TYPE 2 DIABETES MELLITUS WITH HYPERGLYCEMIA, WITH LONG-TERM CURRENT USE OF INSULIN (HCC): ICD-10-CM

## 2024-02-06 LAB
ALBUMIN SERPL BCP-MCNC: 4.2 G/DL (ref 3.2–4.9)
ALBUMIN/GLOB SERPL: 1.6 G/DL
ALP SERPL-CCNC: 86 U/L (ref 30–99)
ALT SERPL-CCNC: 16 U/L (ref 2–50)
ANION GAP SERPL CALC-SCNC: 10 MMOL/L (ref 7–16)
AST SERPL-CCNC: 13 U/L (ref 12–45)
BASOPHILS # BLD AUTO: 0.9 % (ref 0–1.8)
BASOPHILS # BLD: 0.07 K/UL (ref 0–0.12)
BILIRUB SERPL-MCNC: 0.6 MG/DL (ref 0.1–1.5)
BUN SERPL-MCNC: 15 MG/DL (ref 8–22)
CALCIUM ALBUM COR SERPL-MCNC: 8.7 MG/DL (ref 8.5–10.5)
CALCIUM SERPL-MCNC: 8.9 MG/DL (ref 8.5–10.5)
CHLORIDE SERPL-SCNC: 102 MMOL/L (ref 96–112)
CHOLEST SERPL-MCNC: 168 MG/DL (ref 100–199)
CO2 SERPL-SCNC: 27 MMOL/L (ref 20–33)
CREAT SERPL-MCNC: 0.85 MG/DL (ref 0.5–1.4)
EOSINOPHIL # BLD AUTO: 0.18 K/UL (ref 0–0.51)
EOSINOPHIL NFR BLD: 2.4 % (ref 0–6.9)
ERYTHROCYTE [DISTWIDTH] IN BLOOD BY AUTOMATED COUNT: 47.5 FL (ref 35.9–50)
EST. AVERAGE GLUCOSE BLD GHB EST-MCNC: 192 MG/DL
FASTING STATUS PATIENT QL REPORTED: NORMAL
GFR SERPLBLD CREATININE-BSD FMLA CKD-EPI: 92 ML/MIN/1.73 M 2
GLOBULIN SER CALC-MCNC: 2.7 G/DL (ref 1.9–3.5)
GLUCOSE SERPL-MCNC: 89 MG/DL (ref 65–99)
HBA1C MFR BLD: 8.3 % (ref 4–5.6)
HCT VFR BLD AUTO: 47.8 % (ref 42–52)
HDLC SERPL-MCNC: 51 MG/DL
HGB BLD-MCNC: 15.7 G/DL (ref 14–18)
IMM GRANULOCYTES # BLD AUTO: 0.04 K/UL (ref 0–0.11)
IMM GRANULOCYTES NFR BLD AUTO: 0.5 % (ref 0–0.9)
LDLC SERPL CALC-MCNC: 96 MG/DL
LYMPHOCYTES # BLD AUTO: 1.89 K/UL (ref 1–4.8)
LYMPHOCYTES NFR BLD: 25.4 % (ref 22–41)
MCH RBC QN AUTO: 30 PG (ref 27–33)
MCHC RBC AUTO-ENTMCNC: 32.8 G/DL (ref 32.3–36.5)
MCV RBC AUTO: 91.4 FL (ref 81.4–97.8)
MONOCYTES # BLD AUTO: 0.63 K/UL (ref 0–0.85)
MONOCYTES NFR BLD AUTO: 8.5 % (ref 0–13.4)
NEUTROPHILS # BLD AUTO: 4.64 K/UL (ref 1.82–7.42)
NEUTROPHILS NFR BLD: 62.3 % (ref 44–72)
NRBC # BLD AUTO: 0 K/UL
NRBC BLD-RTO: 0 /100 WBC (ref 0–0.2)
PLATELET # BLD AUTO: 274 K/UL (ref 164–446)
PMV BLD AUTO: 10.5 FL (ref 9–12.9)
POTASSIUM SERPL-SCNC: 3.8 MMOL/L (ref 3.6–5.5)
PROT SERPL-MCNC: 6.9 G/DL (ref 6–8.2)
RBC # BLD AUTO: 5.23 M/UL (ref 4.7–6.1)
SODIUM SERPL-SCNC: 139 MMOL/L (ref 135–145)
TRIGL SERPL-MCNC: 104 MG/DL (ref 0–149)
VIT B12 SERPL-MCNC: 309 PG/ML (ref 211–911)
WBC # BLD AUTO: 7.5 K/UL (ref 4.8–10.8)

## 2024-02-06 PROCEDURE — 80053 COMPREHEN METABOLIC PANEL: CPT

## 2024-02-06 PROCEDURE — 85025 COMPLETE CBC W/AUTO DIFF WBC: CPT

## 2024-02-06 PROCEDURE — 83036 HEMOGLOBIN GLYCOSYLATED A1C: CPT

## 2024-02-06 PROCEDURE — 80061 LIPID PANEL: CPT

## 2024-02-06 PROCEDURE — 36415 COLL VENOUS BLD VENIPUNCTURE: CPT

## 2024-02-06 PROCEDURE — 82607 VITAMIN B-12: CPT

## 2024-02-14 ENCOUNTER — OFFICE VISIT (OUTPATIENT)
Dept: MEDICAL GROUP | Facility: PHYSICIAN GROUP | Age: 74
End: 2024-02-14
Payer: COMMERCIAL

## 2024-02-14 VITALS
OXYGEN SATURATION: 92 % | DIASTOLIC BLOOD PRESSURE: 82 MMHG | HEART RATE: 75 BPM | HEIGHT: 73 IN | SYSTOLIC BLOOD PRESSURE: 128 MMHG | BODY MASS INDEX: 33.4 KG/M2 | TEMPERATURE: 98.6 F | WEIGHT: 252 LBS | RESPIRATION RATE: 18 BRPM

## 2024-02-14 DIAGNOSIS — Z79.4 TYPE 2 DIABETES MELLITUS WITH HYPERGLYCEMIA, WITH LONG-TERM CURRENT USE OF INSULIN (HCC): ICD-10-CM

## 2024-02-14 DIAGNOSIS — E11.65 TYPE 2 DIABETES MELLITUS WITH HYPERGLYCEMIA, WITH LONG-TERM CURRENT USE OF INSULIN (HCC): ICD-10-CM

## 2024-02-14 PROCEDURE — 3074F SYST BP LT 130 MM HG: CPT | Performed by: FAMILY MEDICINE

## 2024-02-14 PROCEDURE — 99214 OFFICE O/P EST MOD 30 MIN: CPT | Performed by: FAMILY MEDICINE

## 2024-02-14 PROCEDURE — 3079F DIAST BP 80-89 MM HG: CPT | Performed by: FAMILY MEDICINE

## 2024-02-14 RX ORDER — INSULIN GLARGINE AND LIXISENATIDE 100; 33 U/ML; UG/ML
38 INJECTION, SOLUTION SUBCUTANEOUS
Qty: 9 ML | Refills: 3 | Status: SHIPPED | OUTPATIENT
Start: 2024-02-14 | End: 2024-02-14 | Stop reason: SDUPTHER

## 2024-02-14 RX ORDER — INSULIN GLARGINE AND LIXISENATIDE 100; 33 U/ML; UG/ML
38 INJECTION, SOLUTION SUBCUTANEOUS EVERY EVENING
Qty: 9 ML | Refills: 3 | Status: SHIPPED | OUTPATIENT
Start: 2024-02-14 | End: 2024-02-22 | Stop reason: SDUPTHER

## 2024-02-14 ASSESSMENT — PATIENT HEALTH QUESTIONNAIRE - PHQ9: CLINICAL INTERPRETATION OF PHQ2 SCORE: 0

## 2024-02-14 ASSESSMENT — FIBROSIS 4 INDEX: FIB4 SCORE: 0.84

## 2024-02-14 NOTE — PROGRESS NOTES
"CHIEF COMPLAINT / REASON FOR VISIT  Darvin Jackson is a 73 y.o. male that presents today for   Chief Complaint   Patient presents with    Lab Results     HISTORY OF PRESENT ILLNESS  Currently not checking fasting insulin every morning.  Taking 38 units of Soliqua every evening    OBJECTIVE     /82 (BP Location: Right arm, Patient Position: Sitting, BP Cuff Size: Adult)   Pulse 75   Temp 37 °C (98.6 °F) (Temporal)   Resp 18   Ht 1.854 m (6' 1\")   Wt 114 kg (252 lb)   SpO2 92%   BMI 33.25 kg/m²      PHYSICAL EXAM  Constitutional: Sitting comfortably, in no acute distress, responds to questions appropriately  Skin: Warm and dry, no rashes or lesions on face or exposed upper extremities    ASSESSMENT & PLAN  1. Type 2 diabetes mellitus with hyperglycemia, with long-term current use of insulin (HCC)  Uncontrolled with hemoglobin A1c 8.3. Currently taking Synjardy (Empagliflozin-metformin) 12.5-1000 mg BID, pioglitazone 45 mg, and Soliqua (glargine-lixisenatide) 38 units every evening. Recommend to check fasting glucose and adjust Soliqua dose in 2 unit increments up to every 3 days to achieve fasting glucose goal of . Recheck POC A1c in 3 months in office  - SOLIQUA 100-33 UNT-MCG/ML Solution Pen-injector; Inject 38 Units under the skin every evening. Adjust in 2 unit increments up to every 3 days to achieve fasting glucose goal of . Max dose 60 units  Dispense: 9 mL; Refill: 3      "

## 2024-02-22 DIAGNOSIS — N52.9 ERECTILE DYSFUNCTION, UNSPECIFIED ERECTILE DYSFUNCTION TYPE: ICD-10-CM

## 2024-02-22 DIAGNOSIS — Z79.4 TYPE 2 DIABETES MELLITUS WITH HYPERGLYCEMIA, WITH LONG-TERM CURRENT USE OF INSULIN (HCC): ICD-10-CM

## 2024-02-22 DIAGNOSIS — E11.65 TYPE 2 DIABETES MELLITUS WITH HYPERGLYCEMIA, WITH LONG-TERM CURRENT USE OF INSULIN (HCC): ICD-10-CM

## 2024-02-26 RX ORDER — INSULIN GLARGINE AND LIXISENATIDE 100; 33 U/ML; UG/ML
38 INJECTION, SOLUTION SUBCUTANEOUS EVERY EVENING
Qty: 9 ML | Refills: 3 | Status: SHIPPED | OUTPATIENT
Start: 2024-02-26

## 2024-02-26 RX ORDER — SILDENAFIL 100 MG/1
100 TABLET, FILM COATED ORAL PRN
Qty: 10 TABLET | Refills: 3 | Status: SHIPPED | OUTPATIENT
Start: 2024-02-26

## 2024-02-26 NOTE — TELEPHONE ENCOUNTER
Received request via: Pharmacy    Was the patient seen in the last year in this department? Yes    Does the patient have an active prescription (recently filled or refills available) for medication(s) requested? No    [facility]    Does the patient have long term Plus and need 100 day supply (blood pressure, diabetes and cholesterol meds only)? Patient does not have SCP

## 2024-03-18 ENCOUNTER — TELEPHONE (OUTPATIENT)
Dept: MEDICAL GROUP | Facility: PHYSICIAN GROUP | Age: 74
End: 2024-03-18

## 2024-03-18 NOTE — TELEPHONE ENCOUNTER
Phone Number Called: [phone] (home) [phone] (work)      Call outcome: Left detailed message for patient. Informed to call back with any additional questions.    Message: left vm informing that the medication has been approved.

## 2024-03-18 NOTE — TELEPHONE ENCOUNTER
Phone Number Called: [phone]    Call outcome:  Spoke to pharmacy    Message: Pharmacy try to push the medication through in which had no issues.  The medication has been filled.

## 2024-03-18 NOTE — TELEPHONE ENCOUNTER
VOICEMAIL  1. Caller Name: Darvin Jackson                        Call Back Number: [phone] (home) [phone] (work)      2. Message: Patient states that the pharmacy has been having issues refilling his telmisartan prescription.    3. Patient approves office to leave a detailed voicemail/MyChart message: yes

## 2024-04-15 DIAGNOSIS — N52.9 ERECTILE DYSFUNCTION, UNSPECIFIED ERECTILE DYSFUNCTION TYPE: ICD-10-CM

## 2024-04-16 RX ORDER — SILDENAFIL 100 MG/1
100 TABLET, FILM COATED ORAL PRN
Qty: 10 TABLET | Refills: 11 | Status: SHIPPED | OUTPATIENT
Start: 2024-04-16

## 2024-05-03 ENCOUNTER — DOCUMENTATION (OUTPATIENT)
Dept: HEALTH INFORMATION MANAGEMENT | Facility: OTHER | Age: 74
End: 2024-05-03
Payer: COMMERCIAL

## 2024-05-27 DIAGNOSIS — Z79.4 TYPE 2 DIABETES MELLITUS WITH HYPERGLYCEMIA, WITH LONG-TERM CURRENT USE OF INSULIN (HCC): ICD-10-CM

## 2024-05-27 DIAGNOSIS — E11.65 TYPE 2 DIABETES MELLITUS WITH HYPERGLYCEMIA, WITH LONG-TERM CURRENT USE OF INSULIN (HCC): ICD-10-CM

## 2024-05-28 RX ORDER — BLOOD SUGAR DIAGNOSTIC
STRIP MISCELLANEOUS
Qty: 100 EACH | Refills: 3 | Status: SHIPPED | OUTPATIENT
Start: 2024-05-28

## 2024-05-28 NOTE — TELEPHONE ENCOUNTER
Received request via: Pharmacy    Was the patient seen in the last year in this department? Yes    Does the patient have an active prescription (recently filled or refills available) for medication(s) requested? No    [facility]    Does the patient have group home Plus and need 100 day supply (blood pressure, diabetes and cholesterol meds only)? Patient does not have SCP

## 2024-06-10 ENCOUNTER — TELEPHONE (OUTPATIENT)
Dept: MEDICAL GROUP | Facility: PHYSICIAN GROUP | Age: 74
End: 2024-06-10
Payer: COMMERCIAL

## 2024-06-10 NOTE — TELEPHONE ENCOUNTER
VOICEMAIL  1. Caller Name: Darvin Jackson                        Call Back Number: [phone] (home) [phone] (work)      2. Message: Patient is calling wondering if there is any additional labs that he needs to get done prior to his appointment on 6/11.    3. Patient approves office to leave a detailed voicemail/MyChart message: yes

## 2024-06-11 ENCOUNTER — OFFICE VISIT (OUTPATIENT)
Dept: MEDICAL GROUP | Facility: PHYSICIAN GROUP | Age: 74
End: 2024-06-11
Payer: COMMERCIAL

## 2024-06-11 VITALS
SYSTOLIC BLOOD PRESSURE: 122 MMHG | HEIGHT: 73 IN | HEART RATE: 80 BPM | BODY MASS INDEX: 33.13 KG/M2 | DIASTOLIC BLOOD PRESSURE: 60 MMHG | RESPIRATION RATE: 14 BRPM | WEIGHT: 250 LBS | TEMPERATURE: 97.3 F | OXYGEN SATURATION: 96 %

## 2024-06-11 DIAGNOSIS — E11.65 TYPE 2 DIABETES MELLITUS WITH HYPERGLYCEMIA, WITH LONG-TERM CURRENT USE OF INSULIN (HCC): ICD-10-CM

## 2024-06-11 DIAGNOSIS — Z12.11 COLON CANCER SCREENING: ICD-10-CM

## 2024-06-11 DIAGNOSIS — Z79.4 TYPE 2 DIABETES MELLITUS WITH HYPERGLYCEMIA, WITH LONG-TERM CURRENT USE OF INSULIN (HCC): ICD-10-CM

## 2024-06-11 LAB
HBA1C MFR BLD: 7.3 % (ref ?–5.8)
POCT INT CON NEG: NEGATIVE
POCT INT CON POS: POSITIVE

## 2024-06-11 PROCEDURE — 83036 HEMOGLOBIN GLYCOSYLATED A1C: CPT | Performed by: FAMILY MEDICINE

## 2024-06-11 PROCEDURE — 3074F SYST BP LT 130 MM HG: CPT | Performed by: FAMILY MEDICINE

## 2024-06-11 PROCEDURE — 99214 OFFICE O/P EST MOD 30 MIN: CPT | Performed by: FAMILY MEDICINE

## 2024-06-11 PROCEDURE — 3078F DIAST BP <80 MM HG: CPT | Performed by: FAMILY MEDICINE

## 2024-06-11 ASSESSMENT — FIBROSIS 4 INDEX: FIB4 SCORE: 0.85

## 2024-06-11 NOTE — PROGRESS NOTES
"CHIEF COMPLAINT / REASON FOR VISIT  Darvin Jackson is a 74 y.o. male that presents today for   Chief Complaint   Patient presents with    Follow-Up     HISTORY OF PRESENT ILLNESS  Currently using Soliqua 42 units nightly, however this morning had episode of hypoglycemia with blood glucose level of 65    OBJECTIVE   /60 (BP Location: Left arm, Patient Position: Sitting, BP Cuff Size: Adult)   Pulse 80   Temp 36.3 °C (97.3 °F) (Temporal)   Resp 14   Ht 1.854 m (6' 1\")   Wt 113 kg (250 lb)   SpO2 96%   BMI 32.98 kg/m²      PHYSICAL EXAM  Constitutional: Sitting comfortably, in no acute distress, responds to questions appropriately.  Monofilament testing with a 10 gram force: sensation intact: intact bilaterally  Visual Inspection: Feet without maceration, ulcers, fissures.  Pedal pulses: intact bilaterally  Skin: Warm and dry, no rashes or lesions on face or exposed upper extremities    ASSESSMENT & PLAN  1. Type 2 diabetes mellitus with hyperglycemia, with long-term current  Borderline controlled with hemoglobin A1c 7.3. Currently taking Synjardy (Empagliflozin-metformin) 12.5-1000 mg BID, pioglitazone 45 mg, and Soliqua (glargine-lixisenatide) 42 units every evening. Recommend to reduce Soliqua dose to 40 units due to episode of hypoglycemia. Counseled regarding importance of low-carbohydrate diet and regular exercise for blood glucose control. Recommend follow up in 3 months with new PCP  - Diabetic Monofilament Lower Extremity Exam  - POCT  A1C         "

## 2024-06-18 DIAGNOSIS — N52.9 ERECTILE DYSFUNCTION, UNSPECIFIED ERECTILE DYSFUNCTION TYPE: ICD-10-CM

## 2024-06-18 RX ORDER — SILDENAFIL 100 MG/1
100 TABLET, FILM COATED ORAL PRN
Qty: 10 TABLET | Refills: 0 | Status: SHIPPED | OUTPATIENT
Start: 2024-06-18

## 2024-08-05 DIAGNOSIS — N52.9 ERECTILE DYSFUNCTION, UNSPECIFIED ERECTILE DYSFUNCTION TYPE: ICD-10-CM

## 2024-08-06 RX ORDER — SILDENAFIL 100 MG/1
100 TABLET, FILM COATED ORAL PRN
Qty: 10 TABLET | Refills: 0 | Status: SHIPPED | OUTPATIENT
Start: 2024-08-06

## 2024-08-07 ENCOUNTER — OFFICE VISIT (OUTPATIENT)
Dept: URGENT CARE | Facility: PHYSICIAN GROUP | Age: 74
End: 2024-08-07
Payer: COMMERCIAL

## 2024-08-07 VITALS
TEMPERATURE: 98.2 F | OXYGEN SATURATION: 95 % | RESPIRATION RATE: 18 BRPM | SYSTOLIC BLOOD PRESSURE: 124 MMHG | HEART RATE: 74 BPM | WEIGHT: 256 LBS | HEIGHT: 73 IN | BODY MASS INDEX: 33.93 KG/M2 | DIASTOLIC BLOOD PRESSURE: 70 MMHG

## 2024-08-07 DIAGNOSIS — I10 ESSENTIAL HYPERTENSION: ICD-10-CM

## 2024-08-07 DIAGNOSIS — E11.65 TYPE 2 DIABETES MELLITUS WITH HYPERGLYCEMIA, WITH LONG-TERM CURRENT USE OF INSULIN (HCC): ICD-10-CM

## 2024-08-07 DIAGNOSIS — Z79.4 TYPE 2 DIABETES MELLITUS WITH HYPERGLYCEMIA, WITH LONG-TERM CURRENT USE OF INSULIN (HCC): ICD-10-CM

## 2024-08-07 PROCEDURE — 3078F DIAST BP <80 MM HG: CPT | Performed by: STUDENT IN AN ORGANIZED HEALTH CARE EDUCATION/TRAINING PROGRAM

## 2024-08-07 PROCEDURE — 3074F SYST BP LT 130 MM HG: CPT | Performed by: STUDENT IN AN ORGANIZED HEALTH CARE EDUCATION/TRAINING PROGRAM

## 2024-08-07 PROCEDURE — 99213 OFFICE O/P EST LOW 20 MIN: CPT | Performed by: STUDENT IN AN ORGANIZED HEALTH CARE EDUCATION/TRAINING PROGRAM

## 2024-08-07 RX ORDER — INSULIN GLARGINE AND LIXISENATIDE 100; 33 U/ML; UG/ML
38 INJECTION, SOLUTION SUBCUTANEOUS EVERY EVENING
Qty: 9 ML | Refills: 3 | Status: SHIPPED | OUTPATIENT
Start: 2024-08-07

## 2024-08-07 RX ORDER — PIOGLITAZONEHYDROCHLORIDE 45 MG/1
45 TABLET ORAL DAILY
Qty: 90 TABLET | Refills: 3 | Status: SHIPPED | OUTPATIENT
Start: 2024-08-07

## 2024-08-07 RX ORDER — TELMISARTAN AND HYDROCHLORTHIAZIDE 80; 25 MG/1; MG/1
1 TABLET ORAL DAILY
Qty: 90 TABLET | Refills: 3 | Status: SHIPPED | OUTPATIENT
Start: 2024-08-07

## 2024-08-07 RX ORDER — BLOOD SUGAR DIAGNOSTIC
STRIP MISCELLANEOUS
Qty: 100 EACH | Refills: 3 | Status: SHIPPED | OUTPATIENT
Start: 2024-08-07

## 2024-08-07 RX ORDER — FELODIPINE 10 MG/1
10 TABLET, EXTENDED RELEASE ORAL DAILY
Qty: 90 TABLET | Refills: 3 | Status: SHIPPED | OUTPATIENT
Start: 2024-08-07

## 2024-08-07 ASSESSMENT — FIBROSIS 4 INDEX: FIB4 SCORE: 0.85

## 2024-08-07 NOTE — PROGRESS NOTES
"Subjective:   Darvin Jackson is a 74 y.o. male who presents for Medication Refill (All medication )      HPI:  74-year-old male with a history of type 2 diabetes on insulin and essential hypertension presents to the urgent care for medication refills.  He was his PCP who moved out of the area.  Is getting established with a new PCP but does not have an appointment until October 2024.  Recently got a refill of his Viagra and states that he has plenty of his Synjardy, but does need refills of his other medications.  No complaints this time.  Tolerating medications well.    Medications:    Alcohol Swabs  Blood Glucose Meter Kit  Blood Glucose Test Strips  felodipine ER Tb24  Lancets  Pen Needles Misc  pioglitazone Tabs  sildenafil citrate  Soliqua Sopn  Synjardy Tabs  telmisartan-hydrochlorothiazide    Allergies: Patient has no known allergies.    Problem List: Darvin Jackson does not have any pertinent problems on file.    Surgical History:  Past Surgical History:   Procedure Laterality Date    PROSTATECTOMY, RADICAL RETRO         Past Social Hx: Darivn Jackson  reports that he has been smoking cigars. He has never used smokeless tobacco. He reports current alcohol use of about 1.2 - 3.6 oz of alcohol per week. He reports that he does not use drugs.     Past Family Hx:  Darvin Jackson family history includes Diabetes in his mother; Sleep Apnea in his brother.     Problem list, medications, and allergies reviewed by myself today in Epic.     Objective:     /70 (BP Location: Left arm, Patient Position: Sitting, BP Cuff Size: Adult)   Pulse 74   Temp 36.8 °C (98.2 °F) (Temporal)   Resp 18   Ht 1.854 m (6' 1\")   Wt 116 kg (256 lb)   SpO2 95%   BMI 33.78 kg/m²     Physical Exam  Vitals reviewed.   Cardiovascular:      Rate and Rhythm: Normal rate.      Pulses: Normal pulses.   Pulmonary:      Effort: Pulmonary effort is normal.   Neurological:      Mental Status: He is alert. "         Assessment/Plan:     Diagnosis and associated orders:     1. Type 2 diabetes mellitus with hyperglycemia, with long-term current use of insulin (HCC)  Insulin Pen Needle (PEN NEEDLES) 32G X 5 MM Misc    pioglitazone (ACTOS) 45 MG Tab    SOLIQUA 100-33 UNT-MCG/ML Solution Pen-injector      2. Essential hypertension  felodipine ER (PLENDIL) 10 MG TABLET SR 24 HR    telmisartan-hydrochlorothiazide (MICARDIS HCT) 80-25 MG per tablet         Comments/MDM:     Patient presents for medication refills.  Has new PCP appointment in October 2024.  Reviewed medications which are on file.  Reviewed most recent PCP visit to confirm any dose changes.  Reviewed these with the patient as well.  Refill sent at this time.  Patient has no complaint.         Differential diagnosis, natural history, supportive care, and indications for immediate follow-up discussed.    Advised the patient to follow-up with the primary care physician for recheck, reevaluation, and consideration of further management.    Please note that this dictation was created using voice recognition software. I have made a reasonable attempt to correct obvious errors, but I expect that there are errors of grammar and possibly content that I did not discover before finalizing the note.    Electronically signed by Naman Tavarez PA-C.

## 2024-08-08 DIAGNOSIS — Z79.4 TYPE 2 DIABETES MELLITUS WITH HYPERGLYCEMIA, WITH LONG-TERM CURRENT USE OF INSULIN (HCC): ICD-10-CM

## 2024-08-08 DIAGNOSIS — E11.65 TYPE 2 DIABETES MELLITUS WITH HYPERGLYCEMIA, WITH LONG-TERM CURRENT USE OF INSULIN (HCC): ICD-10-CM

## 2024-09-04 ENCOUNTER — TELEPHONE (OUTPATIENT)
Dept: MEDICAL GROUP | Facility: PHYSICIAN GROUP | Age: 74
End: 2024-09-04

## 2024-09-04 ENCOUNTER — TELEPHONE (OUTPATIENT)
Dept: MEDICAL GROUP | Facility: PHYSICIAN GROUP | Age: 74
End: 2024-09-04
Payer: COMMERCIAL

## 2024-09-04 DIAGNOSIS — Z79.4 TYPE 2 DIABETES MELLITUS WITH HYPERGLYCEMIA, WITH LONG-TERM CURRENT USE OF INSULIN (HCC): ICD-10-CM

## 2024-09-04 DIAGNOSIS — E11.65 TYPE 2 DIABETES MELLITUS WITH HYPERGLYCEMIA, WITH LONG-TERM CURRENT USE OF INSULIN (HCC): ICD-10-CM

## 2024-09-04 RX ORDER — PIOGLITAZONEHYDROCHLORIDE 45 MG/1
45 TABLET ORAL DAILY
Qty: 90 TABLET | Refills: 3 | Status: CANCELLED | OUTPATIENT
Start: 2024-09-04

## 2024-09-04 NOTE — TELEPHONE ENCOUNTER
Pt asked for a call back to confirm medications that he had written. If possible please call pt back.    Thank you

## 2024-09-05 DIAGNOSIS — Z79.4 TYPE 2 DIABETES MELLITUS WITH HYPERGLYCEMIA, WITH LONG-TERM CURRENT USE OF INSULIN (HCC): ICD-10-CM

## 2024-09-05 DIAGNOSIS — E11.65 TYPE 2 DIABETES MELLITUS WITH HYPERGLYCEMIA, WITH LONG-TERM CURRENT USE OF INSULIN (HCC): ICD-10-CM

## 2024-09-05 DIAGNOSIS — I10 ESSENTIAL HYPERTENSION: ICD-10-CM

## 2024-09-05 RX ORDER — PIOGLITAZONEHYDROCHLORIDE 45 MG/1
45 TABLET ORAL DAILY
Qty: 90 TABLET | Refills: 3 | Status: SHIPPED | OUTPATIENT
Start: 2024-09-05

## 2024-09-05 RX ORDER — INSULIN GLARGINE AND LIXISENATIDE 100; 33 U/ML; UG/ML
38 INJECTION, SOLUTION SUBCUTANEOUS EVERY EVENING
Qty: 9 ML | Refills: 3 | Status: SHIPPED | OUTPATIENT
Start: 2024-09-05

## 2024-09-05 RX ORDER — TELMISARTAN AND HYDROCHLORTHIAZIDE 80; 25 MG/1; MG/1
1 TABLET ORAL DAILY
Qty: 90 TABLET | Refills: 3 | Status: SHIPPED | OUTPATIENT
Start: 2024-09-05

## 2024-09-05 RX ORDER — BLOOD SUGAR DIAGNOSTIC
STRIP MISCELLANEOUS
Qty: 100 EACH | Refills: 3 | Status: SHIPPED | OUTPATIENT
Start: 2024-09-05

## 2024-09-05 NOTE — TELEPHONE ENCOUNTER
Received request via: Patient    Was the patient seen in the last year in this department? Yes    Does the patient have an active prescription (recently filled or refills available) for medication(s) requested? No    Pharmacy Name: Pharmacy:   [id number] - [facility], [place] - [address] [place] [address]     Does the patient have longterm Plus and need 100-day supply? (This applies to ALL medications) Patient does not have SCP

## 2024-10-02 ENCOUNTER — OFFICE VISIT (OUTPATIENT)
Dept: MEDICAL GROUP | Facility: PHYSICIAN GROUP | Age: 74
End: 2024-10-02
Payer: COMMERCIAL

## 2024-10-02 VITALS
HEART RATE: 83 BPM | BODY MASS INDEX: 33.13 KG/M2 | DIASTOLIC BLOOD PRESSURE: 82 MMHG | SYSTOLIC BLOOD PRESSURE: 132 MMHG | TEMPERATURE: 98.5 F | WEIGHT: 250 LBS | HEIGHT: 73 IN | RESPIRATION RATE: 18 BRPM | OXYGEN SATURATION: 94 %

## 2024-10-02 DIAGNOSIS — N52.9 ERECTILE DYSFUNCTION, UNSPECIFIED ERECTILE DYSFUNCTION TYPE: ICD-10-CM

## 2024-10-02 DIAGNOSIS — Z13.29 THYROID DISORDER SCREEN: ICD-10-CM

## 2024-10-02 DIAGNOSIS — Z12.5 ENCOUNTER FOR SCREENING FOR MALIGNANT NEOPLASM OF PROSTATE: ICD-10-CM

## 2024-10-02 DIAGNOSIS — E11.65 TYPE 2 DIABETES MELLITUS WITH HYPERGLYCEMIA, WITH LONG-TERM CURRENT USE OF INSULIN (HCC): ICD-10-CM

## 2024-10-02 DIAGNOSIS — E11.59 HYPERTENSION ASSOCIATED WITH TYPE 2 DIABETES MELLITUS (HCC): ICD-10-CM

## 2024-10-02 DIAGNOSIS — Z13.0 SCREENING FOR DEFICIENCY ANEMIA: ICD-10-CM

## 2024-10-02 DIAGNOSIS — Z91.89 10 YEAR RISK OF MI OR STROKE 7.5% OR GREATER: ICD-10-CM

## 2024-10-02 DIAGNOSIS — I15.2 HYPERTENSION ASSOCIATED WITH TYPE 2 DIABETES MELLITUS (HCC): ICD-10-CM

## 2024-10-02 DIAGNOSIS — Z79.4 TYPE 2 DIABETES MELLITUS WITH HYPERGLYCEMIA, WITH LONG-TERM CURRENT USE OF INSULIN (HCC): ICD-10-CM

## 2024-10-02 DIAGNOSIS — E66.9 OBESITY (BMI 30-39.9): ICD-10-CM

## 2024-10-02 DIAGNOSIS — E55.9 VITAMIN D DEFICIENCY: ICD-10-CM

## 2024-10-02 LAB — RETINAL SCREEN: NEGATIVE

## 2024-10-02 PROCEDURE — 99214 OFFICE O/P EST MOD 30 MIN: CPT

## 2024-10-02 PROCEDURE — 92250 FUNDUS PHOTOGRAPHY W/I&R: CPT | Mod: 26

## 2024-10-02 PROCEDURE — 3079F DIAST BP 80-89 MM HG: CPT

## 2024-10-02 PROCEDURE — 3075F SYST BP GE 130 - 139MM HG: CPT

## 2024-10-02 RX ORDER — SILDENAFIL 100 MG/1
100 TABLET, FILM COATED ORAL PRN
Qty: 10 TABLET | Refills: 11 | Status: SHIPPED | OUTPATIENT
Start: 2024-10-02

## 2024-10-02 RX ORDER — EMPAGLIFLOZIN AND METFORMIN HYDROCHLORIDE 12.5; 1 MG/1; MG/1
1 TABLET ORAL 2 TIMES DAILY
Qty: 180 TABLET | Refills: 3 | Status: SHIPPED | OUTPATIENT
Start: 2024-10-02

## 2024-10-02 RX ORDER — FELODIPINE 10 MG/1
10 TABLET, EXTENDED RELEASE ORAL DAILY
Qty: 90 TABLET | Refills: 3 | Status: SHIPPED | OUTPATIENT
Start: 2024-10-02

## 2024-10-02 ASSESSMENT — FIBROSIS 4 INDEX: FIB4 SCORE: 0.85

## 2024-11-06 RX ORDER — PIOGLITAZONE 45 MG/1
45 TABLET ORAL DAILY
Qty: 90 TABLET | Refills: 3 | OUTPATIENT
Start: 2024-11-06

## 2024-11-07 DIAGNOSIS — I10 ESSENTIAL HYPERTENSION: ICD-10-CM

## 2024-11-07 RX ORDER — TELMISARTAN AND HYDROCHLORTHIAZIDE 80; 25 MG/1; MG/1
1 TABLET ORAL DAILY
Qty: 100 TABLET | Refills: 1 | Status: SHIPPED | OUTPATIENT
Start: 2024-11-07

## 2024-11-07 NOTE — TELEPHONE ENCOUNTER
Refill X 6 months, sent to pharmacy.Pt. Seen in the last 6 months per protocol.   Lab Results   Component Value Date/Time    SODIUM 139 02/06/2024 06:18 AM    POTASSIUM 3.8 02/06/2024 06:18 AM    CHLORIDE 102 02/06/2024 06:18 AM    CO2 27 02/06/2024 06:18 AM    GLUCOSE 89 02/06/2024 06:18 AM    BUN 15 02/06/2024 06:18 AM    CREATININE 0.85 02/06/2024 06:18 AM    CREATININE 1.1 03/30/2005 09:15 AM       Tammy Umanzor, Clinical Pharmacist, CDE, CACP  Managed Care Pharmacist for [facility] and [facility]

## 2024-11-13 ENCOUNTER — PATIENT MESSAGE (OUTPATIENT)
Dept: MEDICAL GROUP | Facility: PHYSICIAN GROUP | Age: 74
End: 2024-11-13
Payer: COMMERCIAL

## 2024-11-13 DIAGNOSIS — E11.65 TYPE 2 DIABETES MELLITUS WITH HYPERGLYCEMIA, WITH LONG-TERM CURRENT USE OF INSULIN (HCC): ICD-10-CM

## 2024-11-13 DIAGNOSIS — Z79.4 TYPE 2 DIABETES MELLITUS WITH HYPERGLYCEMIA, WITH LONG-TERM CURRENT USE OF INSULIN (HCC): ICD-10-CM

## 2024-11-14 RX ORDER — INSULIN GLARGINE AND LIXISENATIDE 100; 33 U/ML; UG/ML
38 INJECTION, SOLUTION SUBCUTANEOUS EVERY EVENING
Qty: 9 ML | Refills: 3 | Status: SHIPPED | OUTPATIENT
Start: 2024-11-14

## 2024-11-14 NOTE — PATIENT COMMUNICATION
Received request via: Patient    Was the patient seen in the last year in this department? Yes    Does the patient have an active prescription (recently filled or refills available) for medication(s) requested? No    Pharmacy Name:   [id number] - [facility], [place] - [address] [place] [address]      Does the patient have assisted Plus and need 100-day supply? (This applies to ALL medications) Patient does not have SCP

## 2024-11-18 DIAGNOSIS — E11.65 TYPE 2 DIABETES MELLITUS WITH HYPERGLYCEMIA, WITH LONG-TERM CURRENT USE OF INSULIN (HCC): ICD-10-CM

## 2024-11-18 DIAGNOSIS — N52.9 ERECTILE DYSFUNCTION, UNSPECIFIED ERECTILE DYSFUNCTION TYPE: ICD-10-CM

## 2024-11-18 DIAGNOSIS — Z79.4 TYPE 2 DIABETES MELLITUS WITH HYPERGLYCEMIA, WITH LONG-TERM CURRENT USE OF INSULIN (HCC): ICD-10-CM

## 2024-11-19 RX ORDER — EMPAGLIFLOZIN AND METFORMIN HYDROCHLORIDE 12.5; 1 MG/1; MG/1
1 TABLET ORAL 2 TIMES DAILY
Qty: 180 TABLET | Refills: 3 | Status: SHIPPED | OUTPATIENT
Start: 2024-11-19

## 2024-11-19 RX ORDER — SILDENAFIL 100 MG/1
100 TABLET, FILM COATED ORAL PRN
Qty: 10 TABLET | Refills: 11 | Status: SHIPPED | OUTPATIENT
Start: 2024-11-19 | End: 2024-11-25 | Stop reason: SDUPTHER

## 2024-11-19 NOTE — TELEPHONE ENCOUNTER
Received request via: Patient    Was the patient seen in the last year in this department? Yes    Does the patient have an active prescription (recently filled or refills available) for medication(s) requested? No    Pharmacy Name: Pharmacy:   [id number] - [facility], [place] - [address] [place] [address]      Does the patient have detention Plus and need 100-day supply? (This applies to ALL medications) Patient does not have SCP

## 2024-11-22 ENCOUNTER — TELEPHONE (OUTPATIENT)
Dept: MEDICAL GROUP | Facility: PHYSICIAN GROUP | Age: 74
End: 2024-11-22
Payer: COMMERCIAL

## 2024-11-22 DIAGNOSIS — N52.9 ERECTILE DYSFUNCTION, UNSPECIFIED ERECTILE DYSFUNCTION TYPE: ICD-10-CM

## 2024-11-22 RX ORDER — SILDENAFIL 100 MG/1
100 TABLET, FILM COATED ORAL PRN
Qty: 10 TABLET | Refills: 0 | Status: CANCELLED | OUTPATIENT
Start: 2024-11-22

## 2024-11-22 NOTE — TELEPHONE ENCOUNTER
"Received request via: Pharmacy \"NEED FREQUENCY \"     Was the patient seen in the last year in this department? Yes    Does the patient have an active prescription (recently filled or refills available) for medication(s) requested? No    Pharmacy Name: [facility]     Does the patient have detention Plus and need 100-day supply? (This applies to ALL medications) Patient does not have SCP  "

## 2024-11-25 DIAGNOSIS — N52.9 ERECTILE DYSFUNCTION, UNSPECIFIED ERECTILE DYSFUNCTION TYPE: ICD-10-CM

## 2024-11-25 RX ORDER — SILDENAFIL 100 MG/1
100 TABLET, FILM COATED ORAL PRN
Qty: 10 TABLET | Refills: 11 | OUTPATIENT
Start: 2024-11-25

## 2024-11-25 RX ORDER — SILDENAFIL 100 MG/1
100 TABLET, FILM COATED ORAL
Qty: 10 TABLET | Refills: 11 | Status: SHIPPED | OUTPATIENT
Start: 2024-11-25

## 2024-12-27 DIAGNOSIS — I10 ESSENTIAL HYPERTENSION: ICD-10-CM

## 2024-12-27 RX ORDER — TELMISARTAN AND HYDROCHLORTHIAZIDE 80; 25 MG/1; MG/1
1 TABLET ORAL DAILY
Qty: 100 TABLET | Refills: 0 | Status: SHIPPED | OUTPATIENT
Start: 2024-12-27

## 2025-01-06 ENCOUNTER — HOSPITAL ENCOUNTER (OUTPATIENT)
Facility: MEDICAL CENTER | Age: 75
End: 2025-01-06
Payer: COMMERCIAL

## 2025-01-06 ENCOUNTER — OFFICE VISIT (OUTPATIENT)
Dept: MEDICAL GROUP | Facility: PHYSICIAN GROUP | Age: 75
End: 2025-01-06
Payer: COMMERCIAL

## 2025-01-06 VITALS
TEMPERATURE: 98.3 F | DIASTOLIC BLOOD PRESSURE: 80 MMHG | BODY MASS INDEX: 33.66 KG/M2 | HEART RATE: 77 BPM | RESPIRATION RATE: 14 BRPM | SYSTOLIC BLOOD PRESSURE: 132 MMHG | WEIGHT: 254 LBS | HEIGHT: 73 IN | OXYGEN SATURATION: 94 %

## 2025-01-06 DIAGNOSIS — Z79.4 TYPE 2 DIABETES MELLITUS WITH HYPERGLYCEMIA, WITH LONG-TERM CURRENT USE OF INSULIN (HCC): ICD-10-CM

## 2025-01-06 DIAGNOSIS — E11.59 HYPERTENSION ASSOCIATED WITH TYPE 2 DIABETES MELLITUS (HCC): ICD-10-CM

## 2025-01-06 DIAGNOSIS — I10 ESSENTIAL HYPERTENSION: ICD-10-CM

## 2025-01-06 DIAGNOSIS — N52.9 ERECTILE DYSFUNCTION, UNSPECIFIED ERECTILE DYSFUNCTION TYPE: ICD-10-CM

## 2025-01-06 DIAGNOSIS — E11.65 TYPE 2 DIABETES MELLITUS WITH HYPERGLYCEMIA, WITH LONG-TERM CURRENT USE OF INSULIN (HCC): ICD-10-CM

## 2025-01-06 DIAGNOSIS — Z91.89 10 YEAR RISK OF MI OR STROKE 7.5% OR GREATER: ICD-10-CM

## 2025-01-06 DIAGNOSIS — I15.2 HYPERTENSION ASSOCIATED WITH TYPE 2 DIABETES MELLITUS (HCC): ICD-10-CM

## 2025-01-06 DIAGNOSIS — E66.9 OBESITY (BMI 30-39.9): ICD-10-CM

## 2025-01-06 LAB
CREAT UR-MCNC: 60.04 MG/DL
HBA1C MFR BLD: 7.1 % (ref ?–5.8)
MICROALBUMIN UR-MCNC: 2 MG/DL
MICROALBUMIN/CREAT UR: 33 MG/G (ref 0–30)
POCT INT CON NEG: NEGATIVE
POCT INT CON POS: POSITIVE

## 2025-01-06 PROCEDURE — 82043 UR ALBUMIN QUANTITATIVE: CPT

## 2025-01-06 PROCEDURE — 3075F SYST BP GE 130 - 139MM HG: CPT

## 2025-01-06 PROCEDURE — 82570 ASSAY OF URINE CREATININE: CPT

## 2025-01-06 PROCEDURE — 3079F DIAST BP 80-89 MM HG: CPT

## 2025-01-06 PROCEDURE — 99214 OFFICE O/P EST MOD 30 MIN: CPT

## 2025-01-06 PROCEDURE — 83036 HEMOGLOBIN GLYCOSYLATED A1C: CPT

## 2025-01-06 RX ORDER — FELODIPINE 10 MG/1
10 TABLET, EXTENDED RELEASE ORAL DAILY
Qty: 100 TABLET | Refills: 3 | Status: SHIPPED | OUTPATIENT
Start: 2025-01-06

## 2025-01-06 RX ORDER — EMPAGLIFLOZIN AND METFORMIN HYDROCHLORIDE 12.5; 1 MG/1; MG/1
1 TABLET ORAL 2 TIMES DAILY
Qty: 200 TABLET | Refills: 3 | Status: SHIPPED | OUTPATIENT
Start: 2025-01-06

## 2025-01-06 RX ORDER — INSULIN GLARGINE AND LIXISENATIDE 100; 33 U/ML; UG/ML
38 INJECTION, SOLUTION SUBCUTANEOUS EVERY EVENING
Qty: 12 ML | Refills: 3 | Status: SHIPPED | OUTPATIENT
Start: 2025-01-06 | End: 2025-01-08 | Stop reason: SDUPTHER

## 2025-01-06 RX ORDER — TELMISARTAN AND HYDROCHLORTHIAZIDE 80; 25 MG/1; MG/1
1 TABLET ORAL DAILY
Qty: 100 TABLET | Refills: 3 | Status: SHIPPED | OUTPATIENT
Start: 2025-01-06

## 2025-01-06 RX ORDER — SILDENAFIL 100 MG/1
100 TABLET, FILM COATED ORAL
Qty: 10 TABLET | Refills: 11 | Status: SHIPPED | OUTPATIENT
Start: 2025-01-06

## 2025-01-06 RX ORDER — PIOGLITAZONE 45 MG/1
45 TABLET ORAL DAILY
Qty: 100 TABLET | Refills: 3 | Status: SHIPPED | OUTPATIENT
Start: 2025-01-06

## 2025-01-06 ASSESSMENT — FIBROSIS 4 INDEX: FIB4 SCORE: 0.85

## 2025-01-06 ASSESSMENT — ENCOUNTER SYMPTOMS: BACK PAIN: 1

## 2025-01-06 ASSESSMENT — PATIENT HEALTH QUESTIONNAIRE - PHQ9: CLINICAL INTERPRETATION OF PHQ2 SCORE: 0

## 2025-01-06 NOTE — PATIENT INSTRUCTIONS
[facility]  [address] [place] [address]  Phone: [phone]

## 2025-01-06 NOTE — ASSESSMENT & PLAN NOTE
Chronic, ongoing.   Recommended CT C score as previously ordered, AAA screening as previously ordered.   Discussed healthy lifestyle recommendations

## 2025-01-06 NOTE — ASSESSMENT & PLAN NOTE
Chronic, stable. Tolerating medications well. Continue actos 45 mg daily, synjardy 12.5-1000 mg BID, soliqua 100-33 units, 38 units nightly.    Poct A1c today, uacr today.

## 2025-01-06 NOTE — PROGRESS NOTES
Verbal consent was acquired by the patient to use [employer] ambient listening note generation during this visit     Subjective:     CC: Diagnoses of Hypertension associated with type 2 diabetes mellitus (HCC), Type 2 diabetes mellitus with hyperglycemia, with long-term current use of insulin (HCC), Essential hypertension, Erectile dysfunction, unspecified erectile dysfunction type, 10 year risk of MI or stroke 7.5% or greater, and Obesity (BMI 30-39.9) were pertinent to this visit.    HPI:   Darvin presents today with    History of Present Illness  The patient presents for evaluation of diabetes mellitus, hypertension, hyperlipidemia, and sciatica.    He has been experiencing difficulties in maintaining an adequate supply of Soliqua, which he administers at a dosage of 38 units daily before his evening meal. His blood glucose levels at home have been consistently within the range of 120 to 140. He reports no significant changes in his dietary habits during the Christmas and New Year period. He is currently on a regimen of Actos 45 mg daily and Synjardy 12.5 mg, which he finds financially burdensome even with insurance and Medicare coverage. He procures his medications from [email or web address] and has expressed interest in exploring [facility] as a potential alternative. He estimates that his current supply of Soliqua will last him for another 3 to 4 weeks. He recently purchased a box of pen needles and requires additional strips. He monitors his blood glucose levels once a week, as advised by Dr. Salinas, and adjusts his insulin dosage accordingly. He is uncertain about the brand of glucometer he uses.    He has not yet undergone the CT cardiac and aortic ultrasound that were previously ordered. He is not currently on any cholesterol-lowering medication.    He has been postponing his colonoscopy due to a lack of transportation.    He has been dealing with sciatica, which is exacerbated by prolonged sitting. The pain is  localized to the left side. He has found some relief through maintaining a stiff back posture, as suggested by a YouTube video. He first noticed the pain while on vacation, during which he was engaged in various activities such as working under a sink and performing car maintenance. He reports that the pain has since subsided.    SOCIAL HISTORY  He works as a  at a health clinic.    ALLERGIES  The patient has no known allergies.    MEDICATIONS  Micardis, Plendil, Soliqua, Actos, Synjardy    Current Outpatient Medications   Medication Sig Dispense Refill    SOLIQUA 100-33 UNT-MCG/ML Solution Pen-injector Inject 38 Units under the skin every evening. Adjust in 2 unit increments up to every 3 days to achieve fasting glucose goal of . Max dose 60 units 12 mL 3    SYNJARDY 12.5-1000 MG Tab Take 1 Tablet by mouth 2 times a day. 200 Tablet 3    telmisartan-hydrochlorothiazide (MICARDIS HCT) 80-25 MG per tablet Take 1 Tablet by mouth every day. 100 Tablet 3    felodipine ER (PLENDIL) 10 MG TABLET SR 24 HR Take 1 Tablet by mouth every day. 100 Tablet 3    pioglitazone (ACTOS) 45 MG Tab Take 1 Tablet by mouth every day. 100 Tablet 3    sildenafil citrate (VIAGRA) 100 MG tablet Take 1 Tablet by mouth 1 time a day as needed for Erectile Dysfunction. 10 Tablet 11    Insulin Pen Needle (PEN NEEDLES) 32G X 5 MM Misc Use 1 pen needle daily for insulin injection 100 Each 3    Blood Glucose Test Strips Use one strip to test blood sugar once daily early morning before first meal. 100 Strip 3    Blood Glucose Meter Kit Test blood sugar once every morning 1 Kit 0    Lancets Use one lancet to test blood sugar once daily early morning before first meal. 100 Each 0    Alcohol Swabs Wipe site with prep pad prior to injection. 100 Each 0     No current facility-administered medications for this visit.       Problem   10 Year Risk of MI Or Stroke 7.5% Or Greater   Type 2 Diabetes Mellitus With Hyperglycemia, With Long-Term  "Current Use of Insulin (Hcc)    This is a chronic condition.  Current medications:  Insulin: soliqua 39 units nightly  Biguanide: synjardy 1000 mg bid  GLP1-RA:    SGLT-2i:      DPP4-I: synjardy 12.5 BID  TZD: actos 45 mg daily  Aditya:  Sulfonyluria:     Last A1c: 1/6/25:    7.1%    ;7.3% 6/11/24  Last Microalb/Cr ratio: 10/2/24  Fasting sugars: 120-140 mg/dl reported  Last diabetic foot exam: 6/11/24  Last retinal eye exam: 10/2/24   ACEi/ARB? telmisartan  Statin?   Aspirin?   Concomitant HTN? Felodipine er 10 mg daily, hctz 25 mg daily  Nightly foot checks?       Obesity (Bmi 30-39.9)     ROS:  Review of Systems   Musculoskeletal:  Positive for back pain (left sciatica; improved with exercising).   All other systems reviewed and are negative.      Objective:     Exam:  /80 (BP Location: Left arm, Patient Position: Sitting, BP Cuff Size: Adult)   Pulse 77   Temp 36.8 °C (98.3 °F) (Temporal)   Resp 14   Ht 1.854 m (6' 1\")   Wt 115 kg (254 lb)   SpO2 94%   BMI 33.51 kg/m²  Body mass index is 33.51 kg/m².    Physical Exam  Vitals reviewed.   Constitutional:       General: He is not in acute distress.     Appearance: Normal appearance. He is not ill-appearing.   HENT:      Head: Normocephalic and atraumatic.   Cardiovascular:      Rate and Rhythm: Normal rate and regular rhythm.      Pulses: Normal pulses.      Heart sounds: Normal heart sounds.   Pulmonary:      Effort: Pulmonary effort is normal. No respiratory distress.      Breath sounds: Normal breath sounds.   Skin:     General: Skin is warm and dry.      Findings: No rash.   Neurological:      General: No focal deficit present.      Mental Status: He is alert and oriented to person, place, and time.   Psychiatric:         Mood and Affect: Mood normal.         Behavior: Behavior normal.       Assessment & Plan:     74 y.o. male with the following -     Assessment & Plan  1. Diabetes Mellitus.  His A1c level has improved from 7.3 to 7.1, indicating " effective management of his diabetes. He will continue his current regimen of Actos 45 mg daily and Synjardy 12.5 mg. Prescriptions for Soliqua 38 units nightly and Synjardy 12.5 mg will be sent to [facility]. A prescription for 200 strips will be sent to [facility]. He is advised to inform us about his glucometer type via [employer] to ensure compatibility of the strips. He is encouraged to maintain a healthy diet and regular exercise routine.    2. Hypertension.  His blood pressure is well-controlled on his current medications, Micardis and Plendil. He is advised to continue his current medication regimen and monitor his blood pressure regularly.    3. Hyperlipidemia.  He is not currently on any cholesterol-lowering medication. A CT cardiac score test and an aortic ultrasound have been ordered to assess his cardiovascular risk. He is advised to follow up on these tests.    4. Sciatica.  He reports that maintaining good posture and regular movement have helped alleviate his symptoms. He is advised to continue these practices to manage his sciatica.    5. Health Maintenance.  A colonoscopy has been ordered for colon cancer screening. He is advised to schedule this procedure. A urine sample will be collected today to assess renal function. He is advised to complete his fasting labs within a week prior to his next appointment.    Follow-up  The patient will follow up in 3 months.    Problem List Items Addressed This Visit       Obesity (BMI 30-39.9)     Chronic, unstable. Discussed healthy lifestyle recommendations.            Relevant Medications    SOLIQUA 100-33 UNT-MCG/ML Solution Pen-injector    SYNJARDY 12.5-1000 MG Tab    pioglitazone (ACTOS) 45 MG Tab    Other Relevant Orders    Patient identified as having weight management issue.  Appropriate orders and counseling given.    Hypertension associated with type 2 diabetes mellitus (HCC)    Relevant Medications    SOLIQUA 100-33 UNT-MCG/ML Solution Pen-injector    SYNJARDY  12.5-1000 MG Tab    telmisartan-hydrochlorothiazide (MICARDIS HCT) 80-25 MG per tablet    felodipine ER (PLENDIL) 10 MG TABLET SR 24 HR    pioglitazone (ACTOS) 45 MG Tab    sildenafil citrate (VIAGRA) 100 MG tablet    Type 2 diabetes mellitus with hyperglycemia, with long-term current use of insulin (HCC)     Chronic, stable. Tolerating medications well. Continue actos 45 mg daily, synjardy 12.5-1000 mg BID, soliqua 100-33 units, 38 units nightly.    Poct A1c today, uacr today.         Relevant Medications    SOLIQUA 100-33 UNT-MCG/ML Solution Pen-injector    SYNJARDY 12.5-1000 MG Tab    pioglitazone (ACTOS) 45 MG Tab    Other Relevant Orders    POCT  A1C    MICROALBUMIN CREAT RATIO URINE    10 year risk of MI or stroke 7.5% or greater     Chronic, ongoing.   Recommended CT C score as previously ordered, AAA screening as previously ordered.   Discussed healthy lifestyle recommendations             Other Visit Diagnoses       Essential hypertension        Relevant Medications    telmisartan-hydrochlorothiazide (MICARDIS HCT) 80-25 MG per tablet    felodipine ER (PLENDIL) 10 MG TABLET SR 24 HR    sildenafil citrate (VIAGRA) 100 MG tablet    Erectile dysfunction, unspecified erectile dysfunction type        Relevant Medications    sildenafil citrate (VIAGRA) 100 MG tablet          Patient was educated in proper administration of medication(s) ordered today including safety, possible SE, risks, benefits, rationale and alternatives to therapy.   Supportive care, differential diagnoses, and indications for immediate follow-up discussed with patient.    Pathogenesis of diagnosis discussed including typical length and natural progression.    Instructed to return to clinic or nearest emergency department for any change in condition, further concerns, or worsening of symptoms.  Patient states understanding of the plan of care and discharge instructions.    Return in about 3 months (around 4/6/2025) for Labs.    Please note  that this dictation was created using voice recognition software. I have made every reasonable attempt to correct obvious errors, but I expect that there are errors of grammar and possibly content that I did not discover before finalizing the note.

## 2025-01-07 ENCOUNTER — TELEPHONE (OUTPATIENT)
Dept: MEDICAL GROUP | Facility: PHYSICIAN GROUP | Age: 75
End: 2025-01-07

## 2025-01-08 DIAGNOSIS — E11.65 TYPE 2 DIABETES MELLITUS WITH HYPERGLYCEMIA, WITH LONG-TERM CURRENT USE OF INSULIN (HCC): ICD-10-CM

## 2025-01-08 DIAGNOSIS — Z79.4 TYPE 2 DIABETES MELLITUS WITH HYPERGLYCEMIA, WITH LONG-TERM CURRENT USE OF INSULIN (HCC): ICD-10-CM

## 2025-01-08 RX ORDER — INSULIN GLARGINE AND LIXISENATIDE 100; 33 U/ML; UG/ML
38 INJECTION, SOLUTION SUBCUTANEOUS EVERY EVENING
Qty: 12 ML | Refills: 3 | Status: SHIPPED | OUTPATIENT
Start: 2025-01-08

## 2025-01-08 NOTE — TELEPHONE ENCOUNTER
[employer]IC[employer]AIL  1. Caller Name: Darvin Jackson                        Call Back Number: [phone]    2. Message: Pt is looking to get the medication sent to [facility] since he is running out.     3. Patient approves office to leave a detailed voicemail/MyChart message: no

## 2025-01-08 NOTE — TELEPHONE ENCOUNTER
VOICEMAIL  1. Caller Name: Darvin Jackson                        Call Back Number: [phone] (home) [phone] (work)        2. Message. Pt is calling looking to get an update on his soliqua inj medication. He is requesting for the [facility].     3. Patient approves office to leave a detailed voicemail/MyChart message: yes

## 2025-01-08 NOTE — TELEPHONE ENCOUNTER
Phone Number Called: [phone] (home) [phone] (work)      Call outcome: Left detailed message for patient. Informed to call back with any additional questions.    Message: left vm informing that the medication was sent to the mail order pharmacy.

## 2025-02-11 ENCOUNTER — TELEPHONE (OUTPATIENT)
Dept: HEALTH INFORMATION MANAGEMENT | Facility: OTHER | Age: 75
End: 2025-02-11

## 2025-02-11 DIAGNOSIS — E11.65 TYPE 2 DIABETES MELLITUS WITH HYPERGLYCEMIA, WITH LONG-TERM CURRENT USE OF INSULIN (HCC): ICD-10-CM

## 2025-02-11 DIAGNOSIS — E55.9 VITAMIN D DEFICIENCY: ICD-10-CM

## 2025-02-11 DIAGNOSIS — R53.83 FATIGUE, UNSPECIFIED TYPE: ICD-10-CM

## 2025-02-11 DIAGNOSIS — Z79.4 TYPE 2 DIABETES MELLITUS WITH HYPERGLYCEMIA, WITH LONG-TERM CURRENT USE OF INSULIN (HCC): ICD-10-CM

## 2025-02-11 DIAGNOSIS — Z13.0 SCREENING FOR DEFICIENCY ANEMIA: ICD-10-CM

## 2025-02-11 DIAGNOSIS — Z13.29 THYROID DISORDER SCREEN: ICD-10-CM

## 2025-02-11 NOTE — TELEPHONE ENCOUNTER
1. Caller Name: Darvin Jackson                            Call Back Number: [phone] (home) [phone] (work)        How would the patient prefer to be contacted with a response: [employer] message    Annual Lab request from patient for his upcoming Annual Physical exam in May 13, 2025.  Pt advised he will receive notification via [employer] once lab order is placed.  No other concern on this call.

## 2025-03-20 ENCOUNTER — PATIENT MESSAGE (OUTPATIENT)
Dept: HEALTH INFORMATION MANAGEMENT | Facility: OTHER | Age: 75
End: 2025-03-20

## 2025-03-27 ENCOUNTER — HOSPITAL ENCOUNTER (OUTPATIENT)
Dept: LAB | Facility: MEDICAL CENTER | Age: 75
End: 2025-03-27
Payer: COMMERCIAL

## 2025-03-27 DIAGNOSIS — E11.59 HYPERTENSION ASSOCIATED WITH TYPE 2 DIABETES MELLITUS (HCC): ICD-10-CM

## 2025-03-27 DIAGNOSIS — E55.9 VITAMIN D DEFICIENCY: ICD-10-CM

## 2025-03-27 DIAGNOSIS — Z79.4 TYPE 2 DIABETES MELLITUS WITH HYPERGLYCEMIA, WITH LONG-TERM CURRENT USE OF INSULIN (HCC): ICD-10-CM

## 2025-03-27 DIAGNOSIS — Z12.5 ENCOUNTER FOR SCREENING FOR MALIGNANT NEOPLASM OF PROSTATE: ICD-10-CM

## 2025-03-27 DIAGNOSIS — E11.65 TYPE 2 DIABETES MELLITUS WITH HYPERGLYCEMIA, WITH LONG-TERM CURRENT USE OF INSULIN (HCC): ICD-10-CM

## 2025-03-27 DIAGNOSIS — Z13.29 THYROID DISORDER SCREEN: ICD-10-CM

## 2025-03-27 DIAGNOSIS — I15.2 HYPERTENSION ASSOCIATED WITH TYPE 2 DIABETES MELLITUS (HCC): ICD-10-CM

## 2025-03-27 LAB
25(OH)D3 SERPL-MCNC: 20 NG/ML (ref 30–100)
ALBUMIN SERPL BCP-MCNC: 4.1 G/DL (ref 3.2–4.9)
ALBUMIN/GLOB SERPL: 1.3 G/DL
ALP SERPL-CCNC: 92 U/L (ref 30–99)
ALT SERPL-CCNC: 20 U/L (ref 2–50)
ANION GAP SERPL CALC-SCNC: 12 MMOL/L (ref 7–16)
AST SERPL-CCNC: 27 U/L (ref 12–45)
BILIRUB SERPL-MCNC: 0.4 MG/DL (ref 0.1–1.5)
BUN SERPL-MCNC: 18 MG/DL (ref 8–22)
CALCIUM ALBUM COR SERPL-MCNC: 9 MG/DL (ref 8.5–10.5)
CALCIUM SERPL-MCNC: 9.1 MG/DL (ref 8.5–10.5)
CHLORIDE SERPL-SCNC: 102 MMOL/L (ref 96–112)
CHOLEST SERPL-MCNC: 174 MG/DL (ref 100–199)
CO2 SERPL-SCNC: 25 MMOL/L (ref 20–33)
CREAT SERPL-MCNC: 0.98 MG/DL (ref 0.5–1.4)
ERYTHROCYTE [DISTWIDTH] IN BLOOD BY AUTOMATED COUNT: 48 FL (ref 35.9–50)
EST. AVERAGE GLUCOSE BLD GHB EST-MCNC: 163 MG/DL
GFR SERPLBLD CREATININE-BSD FMLA CKD-EPI: 82 ML/MIN/1.73 M 2
GLOBULIN SER CALC-MCNC: 3.2 G/DL (ref 1.9–3.5)
GLUCOSE SERPL-MCNC: 67 MG/DL (ref 65–99)
HBA1C MFR BLD: 7.3 % (ref 4–5.6)
HCT VFR BLD AUTO: 49.4 % (ref 42–52)
HDLC SERPL-MCNC: 60 MG/DL
HGB BLD-MCNC: 15.9 G/DL (ref 14–18)
LDLC SERPL CALC-MCNC: 95 MG/DL
MCH RBC QN AUTO: 29.4 PG (ref 27–33)
MCHC RBC AUTO-ENTMCNC: 32.2 G/DL (ref 32.3–36.5)
MCV RBC AUTO: 91.5 FL (ref 81.4–97.8)
PLATELET # BLD AUTO: 261 K/UL (ref 164–446)
PMV BLD AUTO: 10.4 FL (ref 9–12.9)
POTASSIUM SERPL-SCNC: 3.6 MMOL/L (ref 3.6–5.5)
PROT SERPL-MCNC: 7.3 G/DL (ref 6–8.2)
PSA SERPL DL<=0.01 NG/ML-MCNC: 0.05 NG/ML (ref 0–4)
RBC # BLD AUTO: 5.4 M/UL (ref 4.7–6.1)
SODIUM SERPL-SCNC: 139 MMOL/L (ref 135–145)
TRIGL SERPL-MCNC: 93 MG/DL (ref 0–149)
TSH SERPL-ACNC: 1.77 UIU/ML (ref 0.35–5.5)
VIT B12 SERPL-MCNC: 308 PG/ML (ref 211–911)
WBC # BLD AUTO: 8.1 K/UL (ref 4.8–10.8)

## 2025-03-27 PROCEDURE — 36415 COLL VENOUS BLD VENIPUNCTURE: CPT

## 2025-03-27 PROCEDURE — 85027 COMPLETE CBC AUTOMATED: CPT

## 2025-03-27 PROCEDURE — 84153 ASSAY OF PSA TOTAL: CPT

## 2025-03-27 PROCEDURE — 84443 ASSAY THYROID STIM HORMONE: CPT

## 2025-03-27 PROCEDURE — 80061 LIPID PANEL: CPT

## 2025-03-27 PROCEDURE — 82607 VITAMIN B-12: CPT

## 2025-03-27 PROCEDURE — 80053 COMPREHEN METABOLIC PANEL: CPT

## 2025-03-27 PROCEDURE — 83036 HEMOGLOBIN GLYCOSYLATED A1C: CPT

## 2025-03-27 PROCEDURE — 82306 VITAMIN D 25 HYDROXY: CPT

## 2025-03-28 ENCOUNTER — RESULTS FOLLOW-UP (OUTPATIENT)
Dept: MEDICAL GROUP | Facility: PHYSICIAN GROUP | Age: 75
End: 2025-03-28

## 2025-04-15 ENCOUNTER — OFFICE VISIT (OUTPATIENT)
Dept: MEDICAL GROUP | Facility: PHYSICIAN GROUP | Age: 75
End: 2025-04-15
Payer: COMMERCIAL

## 2025-04-15 VITALS
RESPIRATION RATE: 16 BRPM | BODY MASS INDEX: 34.82 KG/M2 | HEIGHT: 73 IN | DIASTOLIC BLOOD PRESSURE: 78 MMHG | TEMPERATURE: 97.4 F | HEART RATE: 78 BPM | OXYGEN SATURATION: 94 % | WEIGHT: 262.7 LBS | SYSTOLIC BLOOD PRESSURE: 132 MMHG

## 2025-04-15 DIAGNOSIS — E11.59 HYPERTENSION ASSOCIATED WITH TYPE 2 DIABETES MELLITUS (HCC): ICD-10-CM

## 2025-04-15 DIAGNOSIS — I15.2 HYPERTENSION ASSOCIATED WITH TYPE 2 DIABETES MELLITUS (HCC): ICD-10-CM

## 2025-04-15 DIAGNOSIS — Z12.12 SCREENING FOR COLORECTAL CANCER: ICD-10-CM

## 2025-04-15 DIAGNOSIS — Z12.11 SCREENING FOR COLORECTAL CANCER: ICD-10-CM

## 2025-04-15 DIAGNOSIS — E11.65 TYPE 2 DIABETES MELLITUS WITH HYPERGLYCEMIA, WITH LONG-TERM CURRENT USE OF INSULIN (HCC): ICD-10-CM

## 2025-04-15 DIAGNOSIS — E55.9 VITAMIN D DEFICIENCY: ICD-10-CM

## 2025-04-15 DIAGNOSIS — Z13.6 ENCOUNTER FOR ABDOMINAL AORTIC ANEURYSM (AAA) SCREENING: ICD-10-CM

## 2025-04-15 DIAGNOSIS — Z79.4 TYPE 2 DIABETES MELLITUS WITH HYPERGLYCEMIA, WITH LONG-TERM CURRENT USE OF INSULIN (HCC): ICD-10-CM

## 2025-04-15 PROCEDURE — 3078F DIAST BP <80 MM HG: CPT

## 2025-04-15 PROCEDURE — 99214 OFFICE O/P EST MOD 30 MIN: CPT

## 2025-04-15 PROCEDURE — 3075F SYST BP GE 130 - 139MM HG: CPT

## 2025-04-15 RX ORDER — INSULIN GLARGINE AND LIXISENATIDE 100; 33 U/ML; UG/ML
38 INJECTION, SOLUTION SUBCUTANEOUS EVERY EVENING
Qty: 12 ML | Refills: 3 | Status: SHIPPED | OUTPATIENT
Start: 2025-04-15

## 2025-04-15 RX ORDER — BLOOD SUGAR DIAGNOSTIC
STRIP MISCELLANEOUS
Qty: 100 EACH | Refills: 3 | Status: SHIPPED | OUTPATIENT
Start: 2025-04-15 | End: 2025-04-26 | Stop reason: SDUPTHER

## 2025-04-15 ASSESSMENT — ENCOUNTER SYMPTOMS: ROS SKIN COMMENTS: DRY SKIN

## 2025-04-15 ASSESSMENT — FIBROSIS 4 INDEX: FIB4 SCORE: 1.67

## 2025-04-15 NOTE — PROGRESS NOTES
Verbal consent was acquired by the patient to use [employer] ambient listening note generation during this visit     Subjective:     CC: Diagnoses of Vitamin D deficiency, Type 2 diabetes mellitus with hyperglycemia, with long-term current use of insulin (HCC), Hypertension associated with type 2 diabetes mellitus (HCC), Screening for colorectal cancer, and Encounter for abdominal aortic aneurysm (AAA) screening were pertinent to this visit.    HPI:   Darvin presents today with    History of Present Illness  The patient presents for evaluation of diabetes mellitus, dry skin, and vitamin D deficiency. He is accompanied by a nurse practitioner student.    Diabetes Mellitus  - Reports general well-being with intermittent aches and pains.  - Diabetes management includes Soliqua 39 units at night, Synjardy, and Actos.  - A1c levels have fluctuated around 8, with a recent reading of 7.3.  - He is on [facility] and up-to-date with medications.  - Refills for needles and test strips are occasionally required from [facility].  - Maintaining a healthy diet and regular exercise regimen has been challenging.    Dry Skin  - Increased skin dryness observed with age.  - Uses Cetaphil and Aquaphor lotions for relief.    Vitamin D Deficiency  - Considering vitamin D supplementation; current level is 20.  - Maintains high milk intake.    Supplemental information: Colon cancer screening has not been completed; prefers stool testing over colonoscopy. Received shingles vaccine years ago. Abdominal aortic aneurysm screening was advised but not done.    SOCIAL HISTORY  Occasional smoker.    Current Outpatient Medications   Medication Sig Dispense Refill    SOLIQUA 100-33 UNT-MCG/ML Solution Pen-injector Inject 38 Units under the skin every evening. Adjust in 2 unit increments up to every 3 days to achieve fasting glucose goal of . Max dose 60 units 12 mL 3    Insulin Pen Needle (PEN NEEDLES) 32G X 5 MM Misc Use 1 pen needle  daily for insulin injection 100 Each 3    SYNJARDY 12.5-1000 MG Tab Take 1 Tablet by mouth 2 times a day. 200 Tablet 3    telmisartan-hydrochlorothiazide (MICARDIS HCT) 80-25 MG per tablet Take 1 Tablet by mouth every day. 100 Tablet 3    felodipine ER (PLENDIL) 10 MG TABLET SR 24 HR Take 1 Tablet by mouth every day. 100 Tablet 3    pioglitazone (ACTOS) 45 MG Tab Take 1 Tablet by mouth every day. 100 Tablet 3    sildenafil citrate (VIAGRA) 100 MG tablet Take 1 Tablet by mouth 1 time a day as needed for Erectile Dysfunction. 10 Tablet 11    Blood Glucose Test Strips Use one strip to test blood sugar once daily early morning before first meal. 100 Strip 3    Blood Glucose Meter Kit Test blood sugar once every morning 1 Kit 0    Lancets Use one lancet to test blood sugar once daily early morning before first meal. 100 Each 0    Alcohol Swabs Wipe site with prep pad prior to injection. 100 Each 0     No current facility-administered medications for this visit.       Problem   Vitamin D Deficiency   Type 2 Diabetes Mellitus With Hyperglycemia, With Long-Term Current Use of Insulin (Hcc)    This is a chronic condition.  Current medications:  Insulin: soliqua 39 units nightly  Biguanide: synjardy 1000 mg bid  GLP1-RA:    SGLT-2i:      DPP4-I: synjardy 12.5 BID  TZD: actos 45 mg daily  Aditya:  Sulfonyluria:     Last A1c: 7.3% 3/27/25;  1/6/25:    7.1%    ;7.3% 6/11/24  Last Microalb/Cr ratio: 10/2/24  Fasting sugars: 120-140 mg/dl reported  Last diabetic foot exam: 6/11/24  Last retinal eye exam: 10/2/24   ACEi/ARB? telmisartan  Statin?   Aspirin?   Concomitant HTN? Felodipine er 10 mg daily, hctz 25 mg daily  Nightly foot checks?       Hypertension Associated With Type 2 Diabetes Mellitus (Hcc)       ROS:  Review of Systems   Skin:         Dry skin   All other systems reviewed and are negative.      Objective:     Exam:  /78 (BP Location: Left arm, Patient Position: Sitting, BP Cuff Size: Large adult)   Pulse 78    "Temp 36.3 °C (97.4 °F) (Temporal)   Resp 16   Ht 1.854 m (6' 1\")   Wt 119 kg (262 lb 11.2 oz)   SpO2 94%   BMI 34.66 kg/m²  Body mass index is 34.66 kg/m².    Physical Exam  Vitals reviewed.   Constitutional:       General: He is not in acute distress.     Appearance: Normal appearance. He is not ill-appearing.   HENT:      Head: Normocephalic and atraumatic.   Cardiovascular:      Rate and Rhythm: Normal rate.      Pulses: Normal pulses.   Pulmonary:      Effort: Pulmonary effort is normal. No respiratory distress.   Skin:     General: Skin is warm and dry.      Findings: No rash.   Neurological:      General: No focal deficit present.      Mental Status: He is alert and oriented to person, place, and time.   Psychiatric:         Mood and Affect: Mood normal.         Behavior: Behavior normal.         Labs:    Latest Reference Range & Units 01/06/25 10:26 03/27/25 06:25   WBC 4.8 - 10.8 K/uL  8.1   RBC 4.70 - 6.10 M/uL  5.40   Hemoglobin 14.0 - 18.0 g/dL  15.9   Hematocrit 42.0 - 52.0 %  49.4   MCV 81.4 - 97.8 fL  91.5   MCH 27.0 - 33.0 pg  29.4   MCHC 32.3 - 36.5 g/dL  32.2 (L)   RDW 35.9 - 50.0 fL  48.0   Platelet Count 164 - 446 K/uL  261   MPV 9.0 - 12.9 fL  10.4   Sodium 135 - 145 mmol/L  139   Potassium 3.6 - 5.5 mmol/L  3.6   Chloride 96 - 112 mmol/L  102   Co2 20 - 33 mmol/L  25   Anion Gap 7.0 - 16.0   12.0   Glucose 65 - 99 mg/dL  67   Bun 8 - 22 mg/dL  18   Creatinine 0.50 - 1.40 mg/dL  0.98   GFR (CKD-EPI) >60 mL/min/1.73 m 2  82   Calcium 8.5 - 10.5 mg/dL  9.1   Correct Calcium 8.5 - 10.5 mg/dL  9.0   AST(SGOT) 12 - 45 U/L  27   ALT(SGPT) 2 - 50 U/L  20   Alkaline Phosphatase 30 - 99 U/L  92   Total Bilirubin 0.1 - 1.5 mg/dL  0.4   Albumin 3.2 - 4.9 g/dL  4.1   Total Protein 6.0 - 8.2 g/dL  7.3   Globulin 1.9 - 3.5 g/dL  3.2   A-G Ratio g/dL  1.3   Glycohemoglobin 4.0 - 5.6 %  7.3 (H)   Estim. Avg Glu mg/dL  163   Cholesterol,Tot 100 - 199 mg/dL  174   Triglycerides 0 - 149 mg/dL  93   HDL >=40 " mg/dL  60   LDL <100 mg/dL  95   Micro Alb Creat Ratio 0 - 30 mg/g 33 (H)    Creatinine, Urine mg/dL 60.04    Microalbumin, Urine Random mg/dL 2.0    25-Hydroxy   Vitamin D 25 30 - 100 ng/mL  20 (L)   Prostatic Specific Antigen Tot 0.00 - 4.00 ng/mL  0.05   Vitamin B12 -True Cobalamin 211 - 911 pg/mL  308   TSH 0.350 - 5.500 uIU/mL  1.770   (L): Data is abnormally low  (H): Data is abnormally high    Assessment & Plan:     74 y.o. male with the following -     Assessment & Plan  1. Diabetes Mellitus: Chronic.  - A1c 7.3.  - Protein in urine indicates renal stress likely due to elevated glucose.  - Continue healthy diet, regular exercise, limit sweets and carbs.  - Renewed prescriptions for Soliqua, Synjardy, Actos, and needles sent to [facility].    2. Vitamin D Deficiency.  - Start daily D3 and K2 supplementation.  - Recommended D3 with K2 for better absorption.    3. Dry Skin.  - Maintain hydration, especially as temperatures rise.  - Use Cetaphil and Aquaphor post-shower.  - Limit hot showers to prevent dryness.  - Benefits of thicker creams with less alcohol content discussed.    4. Health Maintenance.  - Improved blood pressure readings.  - Order stool test for colon cancer screening.  - Order shingles vaccine.  - Order ultrasound for abdominal aortic aneurysm screening.    Follow-up in 4 months.    Problem List Items Addressed This Visit          Family Medicine Problems    Vitamin D deficiency    Chronic, unstable. Recommend otc vit d daily.            Other    Hypertension associated with type 2 diabetes mellitus (HCC)    Chronic, stable. 140/66 in clinic, recheck 132/78  Continue felodipine ER 10 mg daily  Continue micardis hct 80/25 mg daily.         Relevant Medications    SOLIQUA 100-33 UNT-MCG/ML Solution Pen-injector    Type 2 diabetes mellitus with hyperglycemia, with long-term current use of insulin (HCC)    Chronic, stable. Continue healthy lifestyle recommendations, continue soliqua 39 units nightly,  synjardy 1000 mg bid, actos 45 mg daily         Relevant Medications    SOLIQUA 100-33 UNT-MCG/ML Solution Pen-injector    Insulin Pen Needle (PEN NEEDLES) 32G X 5 MM Misc     Other Visit Diagnoses         Screening for colorectal cancer        Relevant Orders    Cologuard® colon cancer screening      Encounter for abdominal aortic aneurysm (AAA) screening        Relevant Orders    US-ABDOMINAL AORTA W/O DOPPLER          Patient was educated in proper administration of medication(s) ordered today including safety, possible SE, risks, benefits, rationale and alternatives to therapy.   Supportive care, differential diagnoses, and indications for immediate follow-up discussed with patient.    Pathogenesis of diagnosis discussed including typical length and natural progression.    Instructed to return to clinic or nearest emergency department for any change in condition, further concerns, or worsening of symptoms.  Patient states understanding of the plan of care and discharge instructions.    Return in about 3 months (around 7/15/2025).    Please note that this dictation was created using voice recognition software. I have made every reasonable attempt to correct obvious errors, but I expect that there are errors of grammar and possibly content that I did not discover before finalizing the note.

## 2025-04-15 NOTE — ASSESSMENT & PLAN NOTE
Chronic, stable. 140/66 in clinic, recheck 132/78  Continue felodipine ER 10 mg daily  Continue micardis hct 80/25 mg daily.

## 2025-04-15 NOTE — ASSESSMENT & PLAN NOTE
Chronic, stable. Continue healthy lifestyle recommendations, continue soliqua 39 units nightly, synjardy 1000 mg bid, actos 45 mg daily

## 2025-04-26 DIAGNOSIS — E11.65 TYPE 2 DIABETES MELLITUS WITH HYPERGLYCEMIA, WITH LONG-TERM CURRENT USE OF INSULIN (HCC): ICD-10-CM

## 2025-04-26 DIAGNOSIS — Z79.4 TYPE 2 DIABETES MELLITUS WITH HYPERGLYCEMIA, WITH LONG-TERM CURRENT USE OF INSULIN (HCC): ICD-10-CM

## 2025-04-28 RX ORDER — BLOOD SUGAR DIAGNOSTIC
STRIP MISCELLANEOUS
Qty: 100 EACH | Refills: 3 | Status: SHIPPED | OUTPATIENT
Start: 2025-04-28

## 2025-04-28 NOTE — TELEPHONE ENCOUNTER
Received request via: Pharmacy    Was the patient seen in the last year in this department? Yes    Does the patient have an active prescription (recently filled or refills available) for medication(s) requested? No    Pharmacy Name:      [facility] - [place] [id number] [place] [id number] [address]  Phone: [phone] Fax: [phone]       Does the patient have long-term Plus and need 100-day supply? (This applies to ALL medications) Patient does not have SCP

## 2025-05-08 DIAGNOSIS — E11.65 TYPE 2 DIABETES MELLITUS WITH HYPERGLYCEMIA, WITH LONG-TERM CURRENT USE OF INSULIN (HCC): ICD-10-CM

## 2025-05-08 DIAGNOSIS — Z79.4 TYPE 2 DIABETES MELLITUS WITH HYPERGLYCEMIA, WITH LONG-TERM CURRENT USE OF INSULIN (HCC): ICD-10-CM

## 2025-05-09 NOTE — TELEPHONE ENCOUNTER
Received request via: Pharmacy    Was the patient seen in the last year in this department? Yes    Does the patient have an active prescription (recently filled or refills available) for medication(s) requested? No    [facility]    Does the patient have nursing home Plus and need 100-day supply? (This applies to ALL medications) Patient does not have SCP

## 2025-05-11 LAB — NONINV COLON CA DNA+OCC BLD SCRN STL QL: NEGATIVE

## 2025-05-12 ENCOUNTER — DOCUMENTATION (OUTPATIENT)
Dept: HEALTH INFORMATION MANAGEMENT | Facility: OTHER | Age: 75
End: 2025-05-12
Payer: COMMERCIAL

## 2025-05-12 RX ORDER — PIOGLITAZONE 45 MG/1
45 TABLET ORAL DAILY
Qty: 100 TABLET | Refills: 3 | Status: SHIPPED | OUTPATIENT
Start: 2025-05-12

## 2025-05-12 RX ORDER — EMPAGLIFLOZIN AND METFORMIN HYDROCHLORIDE 12.5; 1 MG/1; MG/1
1 TABLET ORAL 2 TIMES DAILY
Qty: 200 TABLET | Refills: 3 | Status: SHIPPED | OUTPATIENT
Start: 2025-05-12

## 2025-05-14 ENCOUNTER — RESULTS FOLLOW-UP (OUTPATIENT)
Dept: MEDICAL GROUP | Facility: PHYSICIAN GROUP | Age: 75
End: 2025-05-14

## 2025-07-08 DIAGNOSIS — N52.9 ERECTILE DYSFUNCTION, UNSPECIFIED ERECTILE DYSFUNCTION TYPE: ICD-10-CM

## 2025-07-08 DIAGNOSIS — Z79.4 TYPE 2 DIABETES MELLITUS WITH HYPERGLYCEMIA, WITH LONG-TERM CURRENT USE OF INSULIN (HCC): ICD-10-CM

## 2025-07-08 DIAGNOSIS — E11.65 TYPE 2 DIABETES MELLITUS WITH HYPERGLYCEMIA, WITH LONG-TERM CURRENT USE OF INSULIN (HCC): ICD-10-CM

## 2025-07-08 DIAGNOSIS — E11.59 HYPERTENSION ASSOCIATED WITH TYPE 2 DIABETES MELLITUS (HCC): ICD-10-CM

## 2025-07-08 DIAGNOSIS — I15.2 HYPERTENSION ASSOCIATED WITH TYPE 2 DIABETES MELLITUS (HCC): ICD-10-CM

## 2025-07-08 DIAGNOSIS — I10 ESSENTIAL HYPERTENSION: ICD-10-CM

## 2025-07-08 RX ORDER — TELMISARTAN AND HYDROCHLORTHIAZIDE 80; 25 MG/1; MG/1
1 TABLET ORAL DAILY
Qty: 100 TABLET | Refills: 3 | Status: SHIPPED | OUTPATIENT
Start: 2025-07-08

## 2025-07-08 RX ORDER — FELODIPINE 10 MG/1
10 TABLET, EXTENDED RELEASE ORAL DAILY
Qty: 100 TABLET | Refills: 3 | Status: SHIPPED | OUTPATIENT
Start: 2025-07-08

## 2025-07-08 RX ORDER — SILDENAFIL 100 MG/1
100 TABLET, FILM COATED ORAL
Qty: 10 TABLET | Refills: 11 | Status: SHIPPED | OUTPATIENT
Start: 2025-07-08

## 2025-07-08 RX ORDER — PIOGLITAZONE 45 MG/1
45 TABLET ORAL DAILY
Qty: 100 TABLET | Refills: 3 | Status: SHIPPED | OUTPATIENT
Start: 2025-07-08

## 2025-07-08 NOTE — TELEPHONE ENCOUNTER
Received request via: Patient    Was the patient seen in the last year in this department? Yes    Does the patient have an active prescription (recently filled or refills available) for medication(s) requested? No    Pharmacy Name:   [facility] [address] [place] [address] - [address] [place] [id number]  Phone: [phone] Fax: [phone]    Does the patient have [facility] Plus and need 100-day supply? (This applies to ALL medications) Patient does not have SCP

## 2025-08-06 ENCOUNTER — OFFICE VISIT (OUTPATIENT)
Dept: MEDICAL GROUP | Facility: PHYSICIAN GROUP | Age: 75
End: 2025-08-06
Payer: COMMERCIAL

## 2025-08-06 VITALS
DIASTOLIC BLOOD PRESSURE: 62 MMHG | HEIGHT: 73 IN | SYSTOLIC BLOOD PRESSURE: 118 MMHG | RESPIRATION RATE: 16 BRPM | OXYGEN SATURATION: 94 % | BODY MASS INDEX: 34.72 KG/M2 | WEIGHT: 262 LBS | TEMPERATURE: 97.2 F | HEART RATE: 76 BPM

## 2025-08-06 DIAGNOSIS — N52.9 ERECTILE DYSFUNCTION, UNSPECIFIED ERECTILE DYSFUNCTION TYPE: ICD-10-CM

## 2025-08-06 DIAGNOSIS — Z79.4 TYPE 2 DIABETES MELLITUS WITH HYPERGLYCEMIA, WITH LONG-TERM CURRENT USE OF INSULIN (HCC): ICD-10-CM

## 2025-08-06 DIAGNOSIS — I10 ESSENTIAL HYPERTENSION: ICD-10-CM

## 2025-08-06 DIAGNOSIS — E11.65 TYPE 2 DIABETES MELLITUS WITH HYPERGLYCEMIA, WITH LONG-TERM CURRENT USE OF INSULIN (HCC): ICD-10-CM

## 2025-08-06 DIAGNOSIS — E11.59 HYPERTENSION ASSOCIATED WITH TYPE 2 DIABETES MELLITUS (HCC): Primary | ICD-10-CM

## 2025-08-06 DIAGNOSIS — I15.2 HYPERTENSION ASSOCIATED WITH TYPE 2 DIABETES MELLITUS (HCC): Primary | ICD-10-CM

## 2025-08-06 PROCEDURE — 3078F DIAST BP <80 MM HG: CPT

## 2025-08-06 PROCEDURE — 3074F SYST BP LT 130 MM HG: CPT

## 2025-08-06 PROCEDURE — 99214 OFFICE O/P EST MOD 30 MIN: CPT

## 2025-08-06 RX ORDER — TELMISARTAN AND HYDROCHLORTHIAZIDE 80; 25 MG/1; MG/1
1 TABLET ORAL DAILY
Qty: 100 TABLET | Refills: 3 | Status: SHIPPED | OUTPATIENT
Start: 2025-08-06

## 2025-08-06 RX ORDER — SILDENAFIL 100 MG/1
100 TABLET, FILM COATED ORAL
Qty: 10 TABLET | Refills: 11 | Status: SHIPPED | OUTPATIENT
Start: 2025-08-06

## 2025-08-06 RX ORDER — PIOGLITAZONE 45 MG/1
45 TABLET ORAL DAILY
Qty: 100 TABLET | Refills: 3 | Status: SHIPPED | OUTPATIENT
Start: 2025-08-06

## 2025-08-06 RX ORDER — FELODIPINE 10 MG/1
10 TABLET, EXTENDED RELEASE ORAL DAILY
Qty: 100 TABLET | Refills: 3 | Status: SHIPPED | OUTPATIENT
Start: 2025-08-06

## 2025-08-06 ASSESSMENT — FIBROSIS 4 INDEX: FIB4 SCORE: 1.69
